# Patient Record
Sex: FEMALE | Race: WHITE | Employment: FULL TIME | URBAN - METROPOLITAN AREA
[De-identification: names, ages, dates, MRNs, and addresses within clinical notes are randomized per-mention and may not be internally consistent; named-entity substitution may affect disease eponyms.]

---

## 2019-07-20 ENCOUNTER — HOSPITAL ENCOUNTER (EMERGENCY)
Age: 24
Discharge: HOME | End: 2019-07-20
Attending: EMERGENCY MEDICINE
Payer: COMMERCIAL

## 2019-07-20 VITALS
OXYGEN SATURATION: 99 % | RESPIRATION RATE: 16 BRPM | HEIGHT: 69 IN | DIASTOLIC BLOOD PRESSURE: 75 MMHG | BODY MASS INDEX: 27.4 KG/M2 | SYSTOLIC BLOOD PRESSURE: 119 MMHG | HEART RATE: 52 BPM | TEMPERATURE: 98.9 F | WEIGHT: 185 LBS

## 2019-07-20 DIAGNOSIS — N94.6 DYSMENORRHEA: Primary | ICD-10-CM

## 2019-07-20 LAB
ABO/RH: NORMAL
ANION GAP SERPL CALCULATED.3IONS-SCNC: 13 MMOL/L (ref 3–16)
BASOPHILS ABSOLUTE: 0.2 K/UL (ref 0–0.2)
BASOPHILS RELATIVE PERCENT: 2.8 %
BILIRUBIN URINE: NEGATIVE
BLOOD, URINE: NEGATIVE
BUN BLDV-MCNC: 10 MG/DL (ref 7–20)
CALCIUM SERPL-MCNC: 9.5 MG/DL (ref 8.3–10.6)
CHLORIDE BLD-SCNC: 102 MMOL/L (ref 99–110)
CLARITY: CLEAR
CO2: 25 MMOL/L (ref 21–32)
COLOR: YELLOW
CREAT SERPL-MCNC: 0.9 MG/DL (ref 0.6–1.1)
EOSINOPHILS ABSOLUTE: 0.1 K/UL (ref 0–0.6)
EOSINOPHILS RELATIVE PERCENT: 0.9 %
GFR AFRICAN AMERICAN: >60
GFR NON-AFRICAN AMERICAN: >60
GLUCOSE BLD-MCNC: 94 MG/DL (ref 70–99)
GLUCOSE URINE: NEGATIVE MG/DL
GONADOTROPIN, CHORIONIC (HCG) QUANT: <5 MIU/ML
HCG(URINE) PREGNANCY TEST: NEGATIVE
HCT VFR BLD CALC: 36.3 % (ref 36–48)
HEMOGLOBIN: 11.5 G/DL (ref 12–16)
KETONES, URINE: NEGATIVE MG/DL
LEUKOCYTE ESTERASE, URINE: NEGATIVE
LYMPHOCYTES ABSOLUTE: 1.3 K/UL (ref 1–5.1)
LYMPHOCYTES RELATIVE PERCENT: 19.1 %
MCH RBC QN AUTO: 25.8 PG (ref 26–34)
MCHC RBC AUTO-ENTMCNC: 31.8 G/DL (ref 31–36)
MCV RBC AUTO: 81.1 FL (ref 80–100)
MICROSCOPIC EXAMINATION: NORMAL
MONOCYTES ABSOLUTE: 0.2 K/UL (ref 0–1.3)
MONOCYTES RELATIVE PERCENT: 3.5 %
NEUTROPHILS ABSOLUTE: 5.1 K/UL (ref 1.7–7.7)
NEUTROPHILS RELATIVE PERCENT: 73.7 %
NITRITE, URINE: NEGATIVE
PDW BLD-RTO: 14.9 % (ref 12.4–15.4)
PH UA: 6 (ref 5–8)
PLATELET # BLD: 211 K/UL (ref 135–450)
PMV BLD AUTO: 9.6 FL (ref 5–10.5)
POTASSIUM REFLEX MAGNESIUM: 3.9 MMOL/L (ref 3.5–5.1)
PROTEIN UA: NEGATIVE MG/DL
RBC # BLD: 4.48 M/UL (ref 4–5.2)
SODIUM BLD-SCNC: 140 MMOL/L (ref 136–145)
SPECIFIC GRAVITY UA: 1.02 (ref 1–1.03)
URINE REFLEX TO CULTURE: NORMAL
URINE TYPE: NORMAL
UROBILINOGEN, URINE: 0.2 E.U./DL
WBC # BLD: 7 K/UL (ref 4–11)

## 2019-07-20 PROCEDURE — 86901 BLOOD TYPING SEROLOGIC RH(D): CPT

## 2019-07-20 PROCEDURE — 85025 COMPLETE CBC W/AUTO DIFF WBC: CPT

## 2019-07-20 PROCEDURE — 84703 CHORIONIC GONADOTROPIN ASSAY: CPT

## 2019-07-20 PROCEDURE — 99284 EMERGENCY DEPT VISIT MOD MDM: CPT

## 2019-07-20 PROCEDURE — 80048 BASIC METABOLIC PNL TOTAL CA: CPT

## 2019-07-20 PROCEDURE — 81003 URINALYSIS AUTO W/O SCOPE: CPT

## 2019-07-20 PROCEDURE — 84702 CHORIONIC GONADOTROPIN TEST: CPT

## 2019-07-20 PROCEDURE — 86900 BLOOD TYPING SEROLOGIC ABO: CPT

## 2019-07-20 ASSESSMENT — PAIN DESCRIPTION - ORIENTATION: ORIENTATION: LOWER

## 2019-07-20 ASSESSMENT — PAIN SCALES - GENERAL: PAINLEVEL_OUTOF10: 8

## 2019-07-20 ASSESSMENT — PAIN DESCRIPTION - FREQUENCY: FREQUENCY: INTERMITTENT

## 2019-07-20 ASSESSMENT — PAIN DESCRIPTION - LOCATION: LOCATION: ABDOMEN

## 2019-07-20 NOTE — ED PROVIDER NOTES
[address]  EMERGENCY DEPARTMENT ENCOUNTER        Pt Name: Levon Sanders  MRN: [id number]  [place] 1995  Date of evaluation: 7/20/2019  Provider: NAFISA Kan CNP  PCP: NAFISA Carvajal CNP    This patient was seen and evaluated by the attending physician Sushil Ballard, [facility]       Chief Complaint   Patient presents with    Abdominal Pain     Postive pregnancy @ home, onset of vaginal bleeding 7/18, went to [facility] ED \"hcg level was to low to be pregnant\" per Pt, vaginal bleeding increased last pm, onset of ABD pain, clots. HISTORY OF PRESENT ILLNESS   (Location/Symptom, Timing/Onset, Context/Setting, Quality, Duration, Modifying Factors, Severity)  Note limiting factors. Levon Sanders is a 26 y.o. female who presents for evaluation of pelvic pain and vaginal bleeding. Patient states that she started taking birth control pills approximately 2 weeks ago and has been sexually active since then, yesterday she started spotting lightly and took a pregnancy test.  States that her home pregnancy test was positive, she went to an emergency room last night, was told that she was not pregnant, and to follow-up with her OB/GYN. Patient states that today she developed heavier bleeding with clots and is having right-sided pelvic pain. States that she does have a history of ovarian cysts. Patient is G2, P2. Patient denies taking any analgesics or medications prior to arrival.    Nursing Notes were all reviewed and agreed with or any disagreements were addressed  in the HPI. REVIEW OF SYSTEMS    (2-9 systems for level 4, 10 or more for level 5)     Review of Systems    Positives and Pertinent negatives as per HPI. Except as noted abovein the ROS, all other systems were reviewed and negative.        PAST MEDICAL HISTORY     Past Medical History:   Diagnosis Date    Asthma     PCOS (polycystic ovarian syndrome) [facility],  [place], [facility]   Phone [phone]       All other labs were within normal range or not returned as of this dictation. EKG: All EKG's are interpreted by the Emergency Department Physician in the absence of a cardiologist.  Please see their note for interpretation of EKG. RADIOLOGY:   Non-plain film images such as CT, Ultrasound and MRI are read by the radiologist. Plain radiographic images are visualized andpreliminarily interpreted by the  ED Provider with the below findings:        Interpretation perthe Radiologist below, if available at the time of this note:    No orders to display     No results found. PROCEDURES   Unless otherwise noted below, none     Procedures    CRITICAL CARE TIME   N/A    CONSULTS:  None      EMERGENCY DEPARTMENT COURSE and DIFFERENTIAL DIAGNOSIS/MDM:   Vitals:    Vitals:    07/20/19 1356 07/20/19 1404 07/20/19 1424 07/20/19 1439   BP: 133/81 125/77 130/86 119/75   Pulse: 56 52 58 52   Resp: 17 18 17 16   Temp:       TempSrc:       SpO2: 99% 98% 98% 99%   Weight:       Height:           Patient was given thefollowing medications:  Medications - No data to display    Patient is nontoxic, no apparent distress, laying on the bed. Lab work and urinalysis ordered. Urine pregnancy test negative, urinalysis unremarkable. CBC and CMP unremarkable. Rh completed and patient is negative. hCG quantitative negative. 26 -Dr. Vianey Payne at bedside to discuss negative work-up with patient. Patient declined pelvic exam at this time. Patient instructed to follow-up with OB/GYN and return for any worsening symptoms. Differential diagnoses include but are not limited to ectopic pregnancy, pregnancy, urinary tract infection, kidney stone, and dysmenorrhea. FINAL IMPRESSION      1.  Dysmenorrhea          DISPOSITION/PLAN   DISPOSITION Decision To Discharge 07/20/2019 02:24:13 PM      PATIENT REFERREDTO:  NAFISA Epps - CNP  [id number]

## 2023-09-03 ENCOUNTER — HOSPITAL ENCOUNTER (INPATIENT)
Age: 28
LOS: 4 days | Discharge: HOME | End: 2023-09-07
Attending: PSYCHIATRY & NEUROLOGY | Admitting: PSYCHIATRY & NEUROLOGY
Payer: MEDICAID

## 2023-09-03 PROBLEM — F32.A DEPRESSION, UNSPECIFIED: Status: ACTIVE | Noted: 2023-09-03

## 2023-09-03 PROCEDURE — 6370000000 HC RX 637 (ALT 250 FOR IP)

## 2023-09-03 PROCEDURE — 1240000000 HC EMOTIONAL WELLNESS R&B

## 2023-09-03 RX ORDER — MAGNESIUM HYDROXIDE/ALUMINUM HYDROXICE/SIMETHICONE 120; 1200; 1200 MG/30ML; MG/30ML; MG/30ML
30 SUSPENSION ORAL EVERY 6 HOURS PRN
Status: DISCONTINUED | OUTPATIENT
Start: 2023-09-03 | End: 2023-09-07 | Stop reason: HOSPADM

## 2023-09-03 RX ORDER — TRAZODONE HYDROCHLORIDE 50 MG/1
50 TABLET ORAL NIGHTLY PRN
Status: DISCONTINUED | OUTPATIENT
Start: 2023-09-03 | End: 2023-09-07 | Stop reason: HOSPADM

## 2023-09-03 RX ORDER — TIZANIDINE 2 MG/1
1 TABLET ORAL 2 TIMES DAILY PRN
COMMUNITY
Start: 2023-08-30

## 2023-09-03 RX ORDER — PROPRANOLOL HYDROCHLORIDE 10 MG/1
1 TABLET ORAL EVERY 6 HOURS PRN
COMMUNITY
Start: 2023-06-02

## 2023-09-03 RX ORDER — DIPHENHYDRAMINE HYDROCHLORIDE 50 MG/ML
50 INJECTION INTRAMUSCULAR; INTRAVENOUS EVERY 4 HOURS PRN
Status: DISCONTINUED | OUTPATIENT
Start: 2023-09-03 | End: 2023-09-07 | Stop reason: HOSPADM

## 2023-09-03 RX ORDER — FLUOXETINE HYDROCHLORIDE 20 MG/1
20 CAPSULE ORAL DAILY
Status: ON HOLD | COMMUNITY
Start: 2013-06-02 | End: 2023-09-07 | Stop reason: HOSPADM

## 2023-09-03 RX ORDER — OLANZAPINE 5 MG/1
5 TABLET ORAL EVERY 4 HOURS PRN
Status: DISCONTINUED | OUTPATIENT
Start: 2023-09-03 | End: 2023-09-07 | Stop reason: HOSPADM

## 2023-09-03 RX ORDER — POLYETHYLENE GLYCOL 3350 17 G
2 POWDER IN PACKET (EA) ORAL
Status: DISCONTINUED | OUTPATIENT
Start: 2023-09-03 | End: 2023-09-07 | Stop reason: HOSPADM

## 2023-09-03 RX ORDER — LORAZEPAM 0.5 MG/1
0.5 TABLET ORAL EVERY 8 HOURS PRN
COMMUNITY
Start: 2023-08-15

## 2023-09-03 RX ORDER — ACETAMINOPHEN 325 MG/1
650 TABLET ORAL EVERY 4 HOURS PRN
Status: DISCONTINUED | OUTPATIENT
Start: 2023-09-03 | End: 2023-09-07 | Stop reason: HOSPADM

## 2023-09-03 RX ORDER — HYDROXYZINE 50 MG/1
50 TABLET, FILM COATED ORAL 3 TIMES DAILY PRN
Status: DISCONTINUED | OUTPATIENT
Start: 2023-09-03 | End: 2023-09-07 | Stop reason: HOSPADM

## 2023-09-03 RX ADMIN — HYDROXYZINE HYDROCHLORIDE 50 MG: 50 TABLET, FILM COATED ORAL at 18:48

## 2023-09-03 ASSESSMENT — SLEEP AND FATIGUE QUESTIONNAIRES
SLEEP PATTERN: DIFFICULTY FALLING ASLEEP;RESTLESSNESS;DISTURBED/INTERRUPTED SLEEP;NIGHTMARES/TERRORS
AVERAGE NUMBER OF SLEEP HOURS: 6
DO YOU HAVE DIFFICULTY SLEEPING: YES
DO YOU USE A SLEEP AID: YES

## 2023-09-03 ASSESSMENT — PATIENT HEALTH QUESTIONNAIRE - PHQ9
7. TROUBLE CONCENTRATING ON THINGS, SUCH AS READING THE NEWSPAPER OR WATCHING TELEVISION: 2
1. LITTLE INTEREST OR PLEASURE IN DOING THINGS: 3
SUM OF ALL RESPONSES TO PHQ QUESTIONS 1-9: 25
5. POOR APPETITE OR OVEREATING: 3
4. FEELING TIRED OR HAVING LITTLE ENERGY: 3
3. TROUBLE FALLING OR STAYING ASLEEP: 3
SUM OF ALL RESPONSES TO PHQ QUESTIONS 1-9: 25
6. FEELING BAD ABOUT YOURSELF - OR THAT YOU ARE A FAILURE OR HAVE LET YOURSELF OR YOUR FAMILY DOWN: 3
SUM OF ALL RESPONSES TO PHQ QUESTIONS 1-9: 22
8. MOVING OR SPEAKING SO SLOWLY THAT OTHER PEOPLE COULD HAVE NOTICED. OR THE OPPOSITE, BEING SO FIGETY OR RESTLESS THAT YOU HAVE BEEN MOVING AROUND A LOT MORE THAN USUAL: 2
SUM OF ALL RESPONSES TO PHQ QUESTIONS 1-9: 25
SUM OF ALL RESPONSES TO PHQ9 QUESTIONS 1 & 2: 6
10. IF YOU CHECKED OFF ANY PROBLEMS, HOW DIFFICULT HAVE THESE PROBLEMS MADE IT FOR YOU TO DO YOUR WORK, TAKE CARE OF THINGS AT HOME, OR GET ALONG WITH OTHER PEOPLE: 3
9. THOUGHTS THAT YOU WOULD BE BETTER OFF DEAD, OR OF HURTING YOURSELF: 3
2. FEELING DOWN, DEPRESSED OR HOPELESS: 3

## 2023-09-03 ASSESSMENT — LIFESTYLE VARIABLES
HOW OFTEN DO YOU HAVE A DRINK CONTAINING ALCOHOL: 4 OR MORE TIMES A WEEK
HOW MANY STANDARD DRINKS CONTAINING ALCOHOL DO YOU HAVE ON A TYPICAL DAY: 1 OR 2

## 2023-09-03 NOTE — BH NOTE
[facility]  Admission Note     Admission Type:   Admission Type: Voluntary    Reason for admission:  Reason for Admission: Suicidal ideation      Addictive Behavior:   Addictive Behavior  In the Past 3 Months, Have You Felt or Has Someone Told You That You Have a Problem With  : Shopping, Internet use    Medical Problems:   Past Medical History:   Diagnosis Date    Asthma     Hyperadrenalism ([address])     Mitral valve prolapse     PCOS (polycystic ovarian syndrome)        Status EXAM:  Mental Status and Behavioral Exam  Normal: No  Level of Assistance: Independent/Self  Facial Expression: Avoids Gaze, Sad  Affect: Congruent, Stable  Level of Consciousness: Alert  Frequency of Checks: 4 times per hour, close  Mood:Normal: No  Mood: Depressed, Anxious  Motor Activity:Normal: Yes  Eye Contact: Fair  Observed Behavior: Cooperative  Sexual Misconduct History: Current - no  Preception: [place] to person, [place] to time, [place] to place, [place] to situation  Attention:Normal: Yes  Thought Processes: Unremarkable  Thought Content:Normal: Yes  Depression Symptoms: Appetite change, Crying, Change in energy level, Feelings of helplessness, Feelings of hopelessess, Feelings of worthlessness, Impaired concentration, Increased irritability, Isolative, Loss of interest, Sleep disturbance  Anxiety Symptoms: Generalized  Maggy Symptoms: No problems reported or observed.   Hallucinations: None  Delusions: No  Memory:Normal: Yes  Insight and Judgment: No  Insight and Judgment: Poor judgment    Tobacco Screening:  Practical Counseling, on admission, yesi X, if applicable and completed (first 3 are required if patient doesn't refuse):            ( ) Recognizing danger situations (included triggers and roadblocks)                    ( ) Coping skills (new ways to manage stress,relaxation techniques, changing routine, distraction)                                                           ( ) Basic information about quitting (benefits of quitting, techniques in how to quit, available resources  ( ) Referral for counseling faxed to [facility]                                                                                                                   ( ) Patient refused counseling  (x ) Patient has not smoked in the last 30 days    Metabolic Screening:    No results found for: LABA1C    No results found for: CHOL  No results found for: TRIG  No results found for: HDL  No components found for: LDLCAL  No results found for: LABVLDL      There is no height or weight on file to calculate BMI. BP Readings from Last 2 Encounters:   09/03/23 118/78   07/20/19 119/75       Recliner Assessment:  Patient is able to demonstrate the ability to move from a reclining position to an upright position within the recliner. Pt admitted with followings belongings:  Dental Appliances: None  Vision - Corrective Lenses: Contact Lenses  Hearing Aid: None  Jewelry: None  Body Piercings Removed: N/A  Clothing: Shirt, Pants, Undergarments, Footwear  Other Valuables: Other (Comment) (4 debit cards)  The following personal items were collected during admission. Items secured in locker/safe. Items will be returned to patient at discharge.      Mati Markham RN

## 2023-09-03 NOTE — BH NOTE
Pt arrived to unit in stable condition from [facility] by EMS. Oriented to room and unit. Pt denies active suicidal ideation at this time. Constant observation initiated on arrival to floor.

## 2023-09-03 NOTE — BH NOTE
CSSR-S Assessment completed with patient who then scored HIGH RISK. Provider A Juan Diego Brewster was notified of HIGH RISK score.     Is the patient currently expressing suicidal ideation: NO    Is the patient willing to notify staff of suicidal ideation: YES    Completed by: Daisy Heart RN

## 2023-09-04 PROBLEM — Z00.00 GENERAL MEDICAL EXAM: Status: ACTIVE | Noted: 2023-09-04

## 2023-09-04 PROBLEM — N30.00 ACUTE CYSTITIS WITHOUT HEMATURIA: Status: ACTIVE | Noted: 2023-09-04

## 2023-09-04 LAB
BACTERIA URNS QL MICRO: ABNORMAL /HPF
BILIRUB UR QL STRIP.AUTO: NEGATIVE
CLARITY UR: CLEAR
COLOR UR: YELLOW
EPI CELLS #/AREA URNS HPF: ABNORMAL /HPF (ref 0–5)
GLUCOSE UR STRIP.AUTO-MCNC: NEGATIVE MG/DL
HGB UR QL STRIP.AUTO: NEGATIVE
KETONES UR STRIP.AUTO-MCNC: NEGATIVE MG/DL
LEUKOCYTE ESTERASE UR QL STRIP.AUTO: ABNORMAL
MUCOUS THREADS #/AREA URNS LPF: ABNORMAL /LPF
NITRITE UR QL STRIP.AUTO: NEGATIVE
PH UR STRIP.AUTO: 6 [PH] (ref 5–8)
PROT UR STRIP.AUTO-MCNC: NEGATIVE MG/DL
RBC #/AREA URNS HPF: ABNORMAL /HPF (ref 0–4)
SP GR UR STRIP.AUTO: 1.02 (ref 1–1.03)
UA COMPLETE W REFLEX CULTURE PNL UR: YES
UA DIPSTICK W REFLEX MICRO PNL UR: YES
URN SPEC COLLECT METH UR: ABNORMAL
UROBILINOGEN UR STRIP-ACNC: 0.2 E.U./DL
WBC #/AREA URNS HPF: ABNORMAL /HPF (ref 0–5)

## 2023-09-04 PROCEDURE — 1240000000 HC EMOTIONAL WELLNESS R&B

## 2023-09-04 PROCEDURE — 81001 URINALYSIS AUTO W/SCOPE: CPT

## 2023-09-04 PROCEDURE — 99221 1ST HOSP IP/OBS SF/LOW 40: CPT

## 2023-09-04 PROCEDURE — 6370000000 HC RX 637 (ALT 250 FOR IP)

## 2023-09-04 PROCEDURE — 87086 URINE CULTURE/COLONY COUNT: CPT

## 2023-09-04 RX ORDER — FLUOXETINE HYDROCHLORIDE 20 MG/1
20 CAPSULE ORAL DAILY
Status: DISCONTINUED | OUTPATIENT
Start: 2023-09-04 | End: 2023-09-05

## 2023-09-04 RX ORDER — NITROFURANTOIN 25; 75 MG/1; MG/1
100 CAPSULE ORAL EVERY 12 HOURS SCHEDULED
Status: DISCONTINUED | OUTPATIENT
Start: 2023-09-04 | End: 2023-09-06

## 2023-09-04 RX ORDER — LORAZEPAM 0.5 MG/1
0.5 TABLET ORAL EVERY 8 HOURS PRN
Status: DISCONTINUED | OUTPATIENT
Start: 2023-09-04 | End: 2023-09-07 | Stop reason: HOSPADM

## 2023-09-04 RX ADMIN — TRAZODONE HYDROCHLORIDE 50 MG: 50 TABLET ORAL at 20:17

## 2023-09-04 RX ADMIN — NITROFURANTOIN MONOHYDRATE/MACROCRYSTALS 100 MG: 75; 25 CAPSULE ORAL at 20:17

## 2023-09-04 RX ADMIN — FLUOXETINE 20 MG: 20 CAPSULE ORAL at 11:20

## 2023-09-04 RX ADMIN — HYDROXYZINE HYDROCHLORIDE 50 MG: 50 TABLET, FILM COATED ORAL at 19:05

## 2023-09-04 ASSESSMENT — PATIENT HEALTH QUESTIONNAIRE - PHQ9
10. IF YOU CHECKED OFF ANY PROBLEMS, HOW DIFFICULT HAVE THESE PROBLEMS MADE IT FOR YOU TO DO YOUR WORK, TAKE CARE OF THINGS AT HOME, OR GET ALONG WITH OTHER PEOPLE: 3
9. THOUGHTS THAT YOU WOULD BE BETTER OFF DEAD, OR OF HURTING YOURSELF: 3
SUM OF ALL RESPONSES TO PHQ QUESTIONS 1-9: 21
7. TROUBLE CONCENTRATING ON THINGS, SUCH AS READING THE NEWSPAPER OR WATCHING TELEVISION: 2
2. FEELING DOWN, DEPRESSED OR HOPELESS: 3
SUM OF ALL RESPONSES TO PHQ QUESTIONS 1-9: 21
8. MOVING OR SPEAKING SO SLOWLY THAT OTHER PEOPLE COULD HAVE NOTICED. OR THE OPPOSITE, BEING SO FIGETY OR RESTLESS THAT YOU HAVE BEEN MOVING AROUND A LOT MORE THAN USUAL: 2
6. FEELING BAD ABOUT YOURSELF - OR THAT YOU ARE A FAILURE OR HAVE LET YOURSELF OR YOUR FAMILY DOWN: 2
SUM OF ALL RESPONSES TO PHQ QUESTIONS 1-9: 21
1. LITTLE INTEREST OR PLEASURE IN DOING THINGS: 3
3. TROUBLE FALLING OR STAYING ASLEEP: 2
SUM OF ALL RESPONSES TO PHQ QUESTIONS 1-9: 18
5. POOR APPETITE OR OVEREATING: 2
4. FEELING TIRED OR HAVING LITTLE ENERGY: 2
SUM OF ALL RESPONSES TO PHQ9 QUESTIONS 1 & 2: 6

## 2023-09-04 ASSESSMENT — SLEEP AND FATIGUE QUESTIONNAIRES
DO YOU HAVE DIFFICULTY SLEEPING: YES
AVERAGE NUMBER OF SLEEP HOURS: 5
SLEEP PATTERN: DIFFICULTY FALLING ASLEEP;RESTLESSNESS
DO YOU USE A SLEEP AID: YES

## 2023-09-04 NOTE — PROGRESS NOTES
Behavioral Services  Medicare Certification Upon Admission    I certify that this patient's inpatient psychiatric hospital admission is medically necessary for:    [x] (1) Treatment which could reasonably be expected to improve this patient's condition,       [x] (2) Or for diagnostic study;     AND     [x](2) The inpatient psychiatric services are provided while the individual is under the care of a physician and are included in the individualized plan of care.     Estimated length of stay/service 2-5 days    Plan for post-hospital care outpatient services    Electronically signed by NAFISA Mckeon CNP on 9/4/2023 at 11:05 AM

## 2023-09-04 NOTE — H&P
INITIAL PSYCHIATRIC HISTORY AND PHYSICAL      Patient name: Tavia Boyd  Admit date: 9/3/2023  Today's date: 9/4/2023           CC:  Depression    HPI:   Patient seen in room on Adult Behavioral Unit. Patient is a 24 y.o. female who presents  from [facility] for increasing depression and suicidal thoughts. Pt recently purchased a gun with thoughts of shooting herself. Not taking her medication as prescribed. Pt now on BHI, reports that she recently went through a break up with a man she considered a best friend. They were both helping one another stay sober. He relapsed and she feels guilty about this. Pt became more depressed, reports cutting (first time since age 14), and relapsing with alcohol and marijuana. Pt did return to her PCP to go back on Prozac, but has not been taking consistently. Pt reports feeling \"numb\", increased anxiety, thoughts of ending her life, racing/intrusive thoughts. 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 Pt with multiple health issues recently as well. Diagnosed with non-epileptic seizures, her last was on Friday. Seen by  Neurology, placed on Ativan prn for symptoms (chills, tremors, fatigue, cold/clammy skin, does not always lose consciousness). Notes from  recommend mental health treatment for cause of these events. Pt also needs to follow up with cardiology for issues with BP and valve issues that she was told were misdiagnosed. Pt states she gets overwhelmed with all the specialists she was supposed to see, stopped her cardiac meds and has not followed up. Pt vasquez shave good insight into her mental health. Sees a therapist, in the process of switching. Saw a DBT therapist for s short time, and feels that was more helpful to her. Pt denies current HI/AVH, no plan or intent of self harm.   Gun was given away magnesium hydroxide (MILK OF MAGNESIA) 400 MG/5ML suspension 30 mL  30 mL Oral Daily PRN [facility], APRN - CNP        nicotine polacrilex (COMMIT) lozenge 2 mg  2 mg Oral Q1H PRN [facility], APRN - CNP        aluminum & magnesium hydroxide-simethicone (MAALOX) 200-200-20 MG/5ML suspension 30 mL  30 mL Oral Q6H PRN [facility], APRN - CNP        hydrOXYzine HCl (ATARAX) tablet 50 mg  50 mg Oral TID PRN [facility], [facility] - CNP   50 mg at 09/03/23 1848    OLANZapine (ZYPREXA) tablet 5 mg  5 mg Oral Q4H PRN [facility], APRN - CNP        Or    OLANZapine (ZYPREXA) 10 mg in sterile water 2 mL injection  10 mg IntraMUSCular Q4H PRN [facility], APRN - CNP        diphenhydrAMINE (BENADRYL) injection 50 mg  50 mg IntraMUSCular Q4H PRN [facility], APRN - CNP        traZODone (DESYREL) tablet 50 mg  50 mg Oral Nightly PRN [facility], APRN - CNP          FLUoxetine  20 mg Oral Daily      PRN Meds: LORazepam, acetaminophen, magnesium hydroxide, nicotine polacrilex, aluminum & magnesium hydroxide-simethicone, hydrOXYzine HCl, OLANZapine **OR** OLANZapine (ZyPREXA) in sterile water IntraMUSCular, diphenhydrAMINE, traZODone   Estimated length of stay: 2-5 days  Prognosis:  Fair   Criteria for Discharge:  Not suicidal, sleeping well, affect stable, depression improving, eating well, aftercare arranged. Spent > 70 minutes evaluating and treating patient, more than 50 % of that time was spent counseling the patient on their symptoms, treatment, and expected goals. ______  PLAN   1. Admit to Adult Behavioral Unit / Senior Behavioral Unit  2. Consult Internal Medicine to evaluate and treat medical conditions  3. Adjust psychotropic medications to target symptoms  4. Occupational Therapy, Physical Therapy, Group Psychotherapy as tolerated   5. Reviewed treatment plan with patient including medication risks, benefits, side effects. Obtained informed consent for treatment.      Uday Jarrett PMELIESER-BC

## 2023-09-04 NOTE — H&P
5% (2D biplane) Normal left ventricular diastolic function.   - The right ventricle is normal in size. Right ventricular systolic function is   normal.   - Trivial MR and TR.   - Estimated right ventricular systolic pressure is likely underestimated due to a weak or incomplete tricuspid regurgitation signal and is, at least, 20 mmHg  consistent with normal pulmonary artery pressures. Estimated right atrial pressure  is 3 mmHg based on IVC assessment. - The patient has not had a prior CC echocardiographic exam for comparison. Electronically signed by Alida Schultz MD on 8/1/2023 at 5:25:45 PM     ASSESSMENT/PLAN:    #Suicidal Ideation  - per psychiatry team    #Acute Cystitis without hematuria  - UA as above  - start Macrobid  - urine culture pending     #Dysautonomia  #Hx MVP - patient states recently told she does not have  #Implanted loop recorder present  - follows with [facility]  - recently seen by [facility] who r/o MVP and referred patient to EP specialist   - patient reports no longer taken Inderal  - vitals currently stable  - patient needs to follow up with primary cardiologist for closer EP referral as the referral given to her by [facility] is in [facility]    #Psychogenic Nonepileptic Seizures  - reports she has been to a number of different specialists with no answers  - last episode was last Friday  - patient states she usually feels very fatigued, turns pale, sometimes has tremors, but does not always lose consciousness  - extensive seizure workup performed at [facility] where she was monitored for several days with photic stimulation and sleep deprivation  - the episodes she experienced were not correlating with EEG reading but consistent with PNES  - taken off Keppra  - seizure precautions  - patient needs to return to follow up with primary Neurologist     #Alcohol abuse  - no current s/s withdrawal  - monitor        Pt has no medical complaints at this time.  They were informed that should a medical concern arise during their admission they may have BHI contact us.      NAFISA Rivas CNP   9/4/2023 11:06 AM

## 2023-09-04 NOTE — PLAN OF CARE
Problem: Anxiety  Goal: Will report anxiety at manageable levels  Description: INTERVENTIONS:  1. Administer medication as ordered  2. Teach and rehearse alternative coping skills  3. Provide emotional support with 1:1 interaction with staff  Outcome: Progressing     Problem: Coping  Goal: Pt/Family able to verbalize concerns and demonstrate effective coping strategies  Description: INTERVENTIONS:  1. Assist patient/family to identify coping skills, available support systems and cultural and spiritual values  2. Provide emotional support, including active listening and acknowledgement of concerns of patient and caregivers  3. Reduce environmental stimuli, as able  4. Instruct patient/family in relaxation techniques, as appropriate  5. Assess for spiritual pain/suffering and initiate Spiritual Care, Psychosocial Clinical Specialist consults as needed  Outcome: Progressing     Problem: Behavior  Goal: Pt/Family maintain appropriate behavior and adhere to behavioral management agreement, if implemented  Description: INTERVENTIONS:  1. Assess patient/family's coping skills and  non-compliant behavior (including use of illegal substances)  2. Notify security of behavior or suspected illegal substances which indicate the need for search of the family and/or belongings  3. Encourage verbalization of thoughts and concerns in a socially appropriate manner  4. Utilize positive, consistent limit setting strategies supporting safety of patient, staff and others  5. Encourage participation in the decision making process about the behavioral management agreement  6. If a visitor's behavior poses a threat to safety call refer to organization policy. 7. Initiate consult with , Psychosocial CNS, Spiritual Care as appropriate  Outcome: Progressing   Patient has mainly been withdrawn to her room reading. She is A&Ox4 and denies thoughts to want to harm self or others. She denies that she is experiencing hallucinations.

## 2023-09-04 NOTE — PLAN OF CARE
Problem: Anxiety  Goal: Will report anxiety at manageable levels  Description: INTERVENTIONS:  1. Administer medication as ordered  2. Teach and rehearse alternative coping skills  3. Provide emotional support with 1:1 interaction with staff  Outcome: Not Progressing  Flowsheets (Taken 9/3/2023 1658 by Julia Zuleta LPN)  Will report anxiety at manageable levels:   Administer medication as ordered   Teach and rehearse alternative coping skills   Provide emotional support with 1:1 interaction with staff     Problem: Coping  Goal: Pt/Family able to verbalize concerns and demonstrate effective coping strategies  Description: INTERVENTIONS:  1. Assist patient/family to identify coping skills, available support systems and cultural and spiritual values  2. Provide emotional support, including active listening and acknowledgement of concerns of patient and caregivers  3. Reduce environmental stimuli, as able  4. Instruct patient/family in relaxation techniques, as appropriate  5. Assess for spiritual pain/suffering and initiate Spiritual Care, Psychosocial Clinical Specialist consults as needed  Outcome: Not Progressing  Flowsheets (Taken 9/3/2023 1658 by Julia Zuleta LPN)  Patient/family able to verbalize anxieties, fears, and concerns, and demonstrate effective coping:   Provide emotional support, including active listening and acknowledgement of concerns of patient and caregivers   Reduce environmental stimuli, as able   Instruct patient/family in relaxation techniques, as appropriate     Problem: Behavior  Goal: Pt/Family maintain appropriate behavior and adhere to behavioral management agreement, if implemented  Description: INTERVENTIONS:  1. Assess patient/family's coping skills and  non-compliant behavior (including use of illegal substances)  2. Notify security of behavior or suspected illegal substances which indicate the need for search of the family and/or belongings  3.  Encourage verbalization of thoughts
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
## 2023-09-05 LAB — BACTERIA UR CULT: NORMAL

## 2023-09-05 PROCEDURE — 6370000000 HC RX 637 (ALT 250 FOR IP)

## 2023-09-05 PROCEDURE — 1240000000 HC EMOTIONAL WELLNESS R&B

## 2023-09-05 RX ORDER — FLUOXETINE HYDROCHLORIDE 20 MG/1
40 CAPSULE ORAL DAILY
Status: DISCONTINUED | OUTPATIENT
Start: 2023-09-06 | End: 2023-09-07 | Stop reason: HOSPADM

## 2023-09-05 RX ORDER — INSULIN LISPRO 100 [IU]/ML
0-4 INJECTION, SOLUTION INTRAVENOUS; SUBCUTANEOUS
Status: DISCONTINUED | OUTPATIENT
Start: 2023-09-06 | End: 2023-09-05

## 2023-09-05 RX ORDER — INSULIN LISPRO 100 [IU]/ML
0-4 INJECTION, SOLUTION INTRAVENOUS; SUBCUTANEOUS NIGHTLY
Status: DISCONTINUED | OUTPATIENT
Start: 2023-09-05 | End: 2023-09-05

## 2023-09-05 RX ADMIN — FLUOXETINE 20 MG: 20 CAPSULE ORAL at 09:32

## 2023-09-05 RX ADMIN — NITROFURANTOIN MONOHYDRATE/MACROCRYSTALS 100 MG: 75; 25 CAPSULE ORAL at 21:46

## 2023-09-05 RX ADMIN — TRAZODONE HYDROCHLORIDE 50 MG: 50 TABLET ORAL at 21:46

## 2023-09-05 RX ADMIN — MAGNESIUM HYDROXIDE 30 ML: 400 SUSPENSION ORAL at 18:59

## 2023-09-05 RX ADMIN — LORAZEPAM 0.5 MG: 0.5 TABLET ORAL at 12:05

## 2023-09-05 RX ADMIN — NITROFURANTOIN MONOHYDRATE/MACROCRYSTALS 100 MG: 75; 25 CAPSULE ORAL at 09:32

## 2023-09-05 NOTE — PROGRESS NOTES
Bilirubin Urine 09/04/2023 Negative  Negative Final    Ketones, Urine 09/04/2023 Negative  Negative mg/dL Final    Specific Gravity, UA 09/04/2023 1.020  1.005 - 1.030 Final    Blood, Urine 09/04/2023 Negative  Negative Final    pH, UA 09/04/2023 6.0  5.0 - 8.0 Final    Protein, UA 09/04/2023 Negative  Negative mg/dL Final    Urobilinogen, Urine 09/04/2023 0.2  <2.0 E.U./dL Final    Nitrite, Urine 09/04/2023 Negative  Negative Final    Leukocyte Esterase, Urine 09/04/2023 MODERATE (A)  Negative Final    Microscopic Examination 09/04/2023 YES   Final    Urine Type 09/04/2023 NotGiven   Final    Urine received in a container without preservatives.     Urine Reflex to Culture 09/04/2023 Yes   Final    Mucus, UA 09/04/2023 3+ (A)  None Seen /LPF Final    WBC, UA 09/04/2023 10-20 (A)  0 - 5 /HPF Final    RBC, UA 09/04/2023 3-4  0 - 4 /HPF Final    Epithelial Cells, UA 09/04/2023 11-20 (A)  0 - 5 /HPF Final    Bacteria, UA 09/04/2023 1+ (A)  None Seen /HPF Final            Medications  Current Facility-Administered Medications: [START ON 9/6/2023] FLUoxetine (PROZAC) capsule 40 mg, 40 mg, Oral, Daily  LORazepam (ATIVAN) tablet 0.5 mg, 0.5 mg, Oral, Q8H PRN  nitrofurantoin (macrocrystal-monohydrate) (MACROBID) capsule 100 mg, 100 mg, Oral, 2 times per day  acetaminophen (TYLENOL) tablet 650 mg, 650 mg, Oral, Q4H PRN  magnesium hydroxide (MILK OF MAGNESIA) 400 MG/5ML suspension 30 mL, 30 mL, Oral, Daily PRN  nicotine polacrilex (COMMIT) lozenge 2 mg, 2 mg, Oral, Q1H PRN  aluminum & magnesium hydroxide-simethicone (MAALOX) 200-200-20 MG/5ML suspension 30 mL, 30 mL, Oral, Q6H PRN  hydrOXYzine HCl (ATARAX) tablet 50 mg, 50 mg, Oral, TID PRN  OLANZapine (ZYPREXA) tablet 5 mg, 5 mg, Oral, Q4H PRN **OR** OLANZapine (ZYPREXA) 10 mg in sterile water 2 mL injection, 10 mg, IntraMUSCular, Q4H PRN  diphenhydrAMINE (BENADRYL) injection 50 mg, 50 mg, IntraMUSCular, Q4H PRN  traZODone (DESYREL) tablet 50 mg, 50 mg, Oral, Nightly PRN    ASSESSMENT AND PLAN    Principal Problem:    Depression, unspecified  Active Problems:    General medical exam    Acute cystitis without hematuria  Resolved Problems:    * No resolved hospital problems. *       1. Patient's symptoms   are improving  2. Probable discharge is this week  3. Discharge planning is incomplete  4. Suicidal ideation is better  5. Total time with patient was 40 minutes and more than 50 % of that time was spent counseling the patient on their symptoms, treatment and expected goals.      Helen Escalante, KARONP-BC

## 2023-09-05 NOTE — PLAN OF CARE
Problem: Anxiety  Goal: Will report anxiety at manageable levels  Description: INTERVENTIONS:  1. Administer medication as ordered  2. Teach and rehearse alternative coping skills  3. Provide emotional support with 1:1 interaction with staff  Outcome: Progressing     Problem: Coping  Goal: Pt/Family able to verbalize concerns and demonstrate effective coping strategies  Description: INTERVENTIONS:  1. Assist patient/family to identify coping skills, available support systems and cultural and spiritual values  2. Provide emotional support, including active listening and acknowledgement of concerns of patient and caregivers  3. Reduce environmental stimuli, as able  4. Instruct patient/family in relaxation techniques, as appropriate  5. Assess for spiritual pain/suffering and initiate Spiritual Care, Psychosocial Clinical Specialist consults as needed  Outcome: Progressing     Problem: Behavior  Goal: Pt/Family maintain appropriate behavior and adhere to behavioral management agreement, if implemented  Description: INTERVENTIONS:  1. Assess patient/family's coping skills and  non-compliant behavior (including use of illegal substances)  2. Notify security of behavior or suspected illegal substances which indicate the need for search of the family and/or belongings  3. Encourage verbalization of thoughts and concerns in a socially appropriate manner  4. Utilize positive, consistent limit setting strategies supporting safety of patient, staff and others  5. Encourage participation in the decision making process about the behavioral management agreement  6. If a visitor's behavior poses a threat to safety call refer to organization policy. 7. Initiate consult with , Psychosocial CNS, Spiritual Care as appropriate  Outcome: Progressing   Pt pleasant cooperative visible on unit all shift, pleasant on approach social with staff and peers.

## 2023-09-05 NOTE — GROUP NOTE
Group Therapy Note    Date: 9/5/2023    Group Start Time: 1100  Group End Time: 1200  Group Topic: [address], [place]        Group Therapy Note    Attendees: 4    SW met w/the group and used active listening to engage in conversation while checking in and discussing their goals from the morning meeting. The group reviewed and discuss the CBT triangle and how our thoughts, feelings, and behavior impact one another. The group then worked to review and discuss S.M.A.R.T. goals. The group worked to create, review and revise SMART goals that they can work on during their time on the unit. Notes: The Pt was cooperative and engaged throughout the group. The Pt was able to create a SMART goal and discuss it with the group. Status After Intervention:  Improved    Participation Level:  Active Listener and Interactive    Participation Quality: Appropriate, Attentive, and Sharing      Speech:  normal      Thought Process/Content: Logical      Affective Functioning: Congruent      Mood: anxious      Level of consciousness:  Alert      Response to Learning: Able to verbalize current knowledge/experience and Able to verbalize/acknowledge new learning      Endings: None Reported    Modes of Intervention: Education, Exploration, and Problem-solving      Discipline Responsible: /Counselor      Signature:  BRANDON Sanchez, [place]

## 2023-09-05 NOTE — GROUP NOTE
Group Therapy Note    Date: 9/5/2023    Group Start Time: 1300  Group End Time: 7281  Group Topic: Psychoeducation    [facility]        Group Therapy Note    Topic: Negative Automatic Thoughts    Group facilitator provided foundational information and framed process discussion of automatic thoughts, fight/flight/freeze/andry response influencing thoughts, methods to identify and inventory automatic thoughts, and mindfulness strategies to observe and manage behavioral responses. Attendees: 4       Notes:  David Mccray was present and actively engaged across discussions. Provided feedback and encouragement to peers while assisting in process of automatic thoughts, grief influencing perceptions. Relayed information she has learned from her individual therapist. Validated and supported peers throughout. No needs verbalized. Status After Intervention:  Improved    Participation Level:  Active Listener and Interactive    Participation Quality: Sharing and Supportive      Speech:  normal      Thought Process/Content: Logical      Affective Functioning: Congruent      Mood: euthymic      Level of consciousness:  Alert and Oriented x4      Response to Learning: Progressing to goal      Endings: None Reported    Modes of Intervention: Education, Support, Socialization, Exploration, Clarifying, and Problem-solving      Discipline Responsible: Psychoeducational Specialist      Signature:  Aníbal Trinidad, MM, MT-BC

## 2023-09-05 NOTE — PROGRESS NOTES
Patient visible interacting well with peers in common area upon initial observation this shift. She is cooperative with assessment. Patient reports mild anxiety, unspecified. She denies any depressive symptoms at this time. She is observed with improved affect, bright. Good eye contact. Patient denies any suicidal ideations and will notify staff should that change. Patient denies HI/aVH. She is compliant with first dose of Macrobid and received PRN Trazodone per request for sleep. No distress noted. Continuing to monitor.

## 2023-09-06 PROCEDURE — 1240000000 HC EMOTIONAL WELLNESS R&B

## 2023-09-06 PROCEDURE — 6370000000 HC RX 637 (ALT 250 FOR IP)

## 2023-09-06 RX ADMIN — TRAZODONE HYDROCHLORIDE 50 MG: 50 TABLET ORAL at 21:02

## 2023-09-06 RX ADMIN — NITROFURANTOIN MONOHYDRATE/MACROCRYSTALS 100 MG: 75; 25 CAPSULE ORAL at 08:22

## 2023-09-06 RX ADMIN — FLUOXETINE 40 MG: 20 CAPSULE ORAL at 08:22

## 2023-09-06 NOTE — GROUP NOTE
Group Therapy Note    Date: 9/6/2023    Group Start Time: 1300  Group End Time: 5152  Group Topic: Activity    [address] [facility] [address], [place]        Group Therapy Note    Attendees: 5    Therapist facilitated interactive group for participants to engage and share amongst one another. Group began with using the conversation ball as an ice breaker to encourage participation and socialization. Group members then participated in a self-reflection activity where they were challenged to identify 3 cards in which the adjective described them best.  Participants shared their three cards aloud and explained why they chose them. Group processed how identifying personal strengths aids in the recovery process and helps build purpose and self esteem. Notes:  Patient was present and engaged across duration of group. Patient actively listened and participated in group conversation through sharing and offering encouragement. Patient fully participated in group activities and asked to keep strength cards as a reminder. Met goal for group. Status After Intervention:  Improved    Participation Level:  Active Listener and Interactive    Participation Quality: Appropriate, Attentive, Sharing, and Supportive      Speech:  normal      Thought Process/Content: Logical      Affective Functioning: Congruent      Mood: euthymic      Level of consciousness:  Alert, Oriented x4, and Attentive      Response to Learning: Able to verbalize current knowledge/experience, Able to verbalize/acknowledge new learning, Able to retain information, Capable of insight, Able to change behavior, and Progressing to goal      Endings: None Reported    Modes of Intervention: Support, Socialization, Exploration, and Activity      Discipline Responsible: Psychoeducational Specialist and Recreational Therapist      Signature:  Nicholas Barrios MA, Dominic Dickerson

## 2023-09-06 NOTE — PLAN OF CARE
[facility]  Treatment Team Note  Review Date & Time: 09/06/23 0960    Patient was not in treatment team      Status EXAM:   Mental Status and Behavioral Exam  Normal: No  Level of Assistance: Independent/Self  Facial Expression: Sad  Affect: Appropriate  Level of Consciousness: Alert  Frequency of Checks: 4 times per hour, close  Mood:Normal: No  Mood: Sad  Motor Activity:Normal: Yes  Motor Activity: Other (comment) (wnl)  Eye Contact: Good  Observed Behavior: Friendly, Cooperative  Sexual Misconduct History: Current - no  Preception: [place] to person, [place] to time, [place] to place  Attention:Normal: Yes  Thought Processes: Other (comment) (wnl)  Thought Content:Normal: Yes  Thought Content: Other (comment) (wnl)  Depression Symptoms: Appetite change, Sleep disturbance  Anxiety Symptoms: Generalized  Maggy Symptoms: No problems reported or observed. Hallucinations: None  Delusions: No  Memory:Normal: Yes  Insight and Judgment: No  Insight and Judgment: Poor judgment      Suicide Risk CSSR-S:  1) Within the past month, have you wished you were dead or wished you could go to sleep and not wake up? : Yes  2) Have you actually had any thoughts of killing yourself? : Yes  3) Have you been thinking about how you might kill yourself? : Yes  5) Have you started to work out or worked out the details of how to kill yourself? Do you intend to carry out this plan? : No  6) Have you ever done anything, started to do anything, or prepared to do anything to end your life?: Yes      PLAN/TREATMENT RECOMMENDATIONS UPDATE:   Patient will take medication as prescribed, eat 75% of meals, attend groups, participate in milieu activities, participate in treatment team and care planning for discharge and follow up.            Santiago Ro RN

## 2023-09-06 NOTE — GROUP NOTE
Group Therapy Note    Date: 9/6/2023    Group Start Time: 1000  Group End Time: 1100  Group Topic: Psychoeducation    ANGEL Escobar        Group Therapy Note    Attendees: 5       Patient's Goal:  to learn and discuss the communication styles of being assertive, passive and aggressive and that communicating in an assertive manner is the healthiest way to communicate. Pt's asked to apply to their lives. Notes:  pt attended group for the full duration. She actively participated in group discussion and was able to apply to herself. pt wants to work on being more assertive. Status After Intervention:  Improved    Participation Level:  Active Listener and Interactive    Participation Quality: Appropriate, Attentive, and Sharing      Speech:  normal      Thought Process/Content: Logical      Affective Functioning: Congruent      Mood: anxious      Level of consciousness:  Alert, Oriented x4, and Attentive      Response to Learning: Able to verbalize current knowledge/experience, Able to verbalize/acknowledge new learning, and Progressing to goal      Endings: None Reported    Modes of Intervention: Education, Support, Socialization, Exploration, and Clarifying      Discipline Responsible: /Counselor      Signature:  ANGEL Esteves

## 2023-09-06 NOTE — GROUP NOTE
Group Therapy Note    Date: 9/6/2023    Group Start Time: 1100  Group End Time: 1200  Group Topic: [address], [place]        Group Therapy Note    Attendees: 7    SW used active listening to engage in conversation while checking in with the Pts and reviewing the 3 types of communication. The group then worked to discuss the ways to communicate effectively versus ineffectively by remaining open, curious, and ready to learn versus being closed, defensive and determined to be right. The group worked to share scenarios and brainstorm ways to use effective communication to work through them. Notes: The Pt was cooperative and engaged throughout the group. The Pt was able to share and/or support peers as they discussed scenarios and worked to problem solve them. Status After Intervention:  Improved    Participation Level:  Active Listener and Interactive    Participation Quality: Appropriate, Attentive, and Sharing      Speech:  normal      Thought Process/Content: Logical      Affective Functioning: Congruent      Mood: anxious      Level of consciousness:  Alert      Response to Learning: Able to verbalize current knowledge/experience and Able to verbalize/acknowledge new learning      Endings: None Reported    Modes of Intervention: Education, Exploration, and Problem-solving      Discipline Responsible: /Counselor      Signature:  BRANDON Liang, [place]

## 2023-09-06 NOTE — PLAN OF CARE
Nursing shift report 1900 to present. Patient was visible on the unit in the evening, attended group, social with select peers with no unsafe behaviors noted. She was medication compliant, denied SI, stated although still depressed she was feeling better today and also denied any UTI symptoms. Safety checks continue. Problem: Anxiety  Goal: Will report anxiety at manageable levels  Description: INTERVENTIONS:  1. Administer medication as ordered  2. Teach and rehearse alternative coping skills  3. Provide emotional support with 1:1 interaction with staff  9/6/2023 0003 by Meri Mcardle, RN  Outcome: Progressing    Problem: Self Harm/Suicidality  Goal: Will have no self-injury during hospital stay  Description: INTERVENTIONS:  1. Ensure constant observer at bedside with Q15M safety checks  2. Maintain a safe environment  3. Secure patient belongings  4. Ensure family/visitors adhere to safety recommendations  5. Ensure safety tray has been added to patient's diet order  6.   Every shift and PRN: Re-assess suicidal risk via Frequent Screener    Outcome: Progressing

## 2023-09-06 NOTE — PROGRESS NOTES
Group Therapy Note    Date: 9/5/2023  Start Time: 20:00  End Time:  21:00  Number of Participants: 9    Type of Group: Recreational   wrap up    Wellness Binder Information  Module Name:  /  Session Number:  /    Patient's Goal:  coping skills    Notes:  continuing to work on goal    Status After Intervention:  Unchanged    Participation Level:  Active Listener and Interactive    Participation Quality: Appropriate, Attentive, and Sharing      Speech:  pressured      Thought Process/Content: Logical      Affective Functioning: Blunted      Mood: anxious and depressed      Level of consciousness:  Alert, Oriented x4, and Attentive      Response to Learning: Able to change behavior      Endings: None Reported    Modes of Intervention: Socialization and Problem-solving      Discipline Responsible: [address]      Signature:  Graeme Elise

## 2023-09-06 NOTE — PLAN OF CARE
PT visible on unit eating in dinning room and attending groups, social with staff and peers. Pt requesting information on IOP program and making plans for work and discharge, denies SI,HI,AVH, pleasant on approach complies with medication and care.

## 2023-09-07 VITALS
WEIGHT: 200 LBS | BODY MASS INDEX: 31.39 KG/M2 | HEIGHT: 67 IN | DIASTOLIC BLOOD PRESSURE: 52 MMHG | SYSTOLIC BLOOD PRESSURE: 101 MMHG | RESPIRATION RATE: 16 BRPM | TEMPERATURE: 97.9 F | OXYGEN SATURATION: 98 % | HEART RATE: 62 BPM

## 2023-09-07 PROCEDURE — 6370000000 HC RX 637 (ALT 250 FOR IP)

## 2023-09-07 PROCEDURE — 5130000000 HC BRIDGE APPOINTMENT

## 2023-09-07 RX ORDER — HYDROXYZINE 50 MG/1
50 TABLET, FILM COATED ORAL 3 TIMES DAILY PRN
Qty: 30 TABLET | Refills: 0 | Status: SHIPPED | OUTPATIENT
Start: 2023-09-07 | End: 2023-09-17

## 2023-09-07 RX ORDER — TRAZODONE HYDROCHLORIDE 50 MG/1
50 TABLET ORAL NIGHTLY PRN
Qty: 30 TABLET | Refills: 0 | Status: SHIPPED | OUTPATIENT
Start: 2023-09-07

## 2023-09-07 RX ORDER — FLUOXETINE HYDROCHLORIDE 40 MG/1
40 CAPSULE ORAL DAILY
Qty: 30 CAPSULE | Refills: 0 | Status: SHIPPED | OUTPATIENT
Start: 2023-09-08

## 2023-09-07 RX ADMIN — FLUOXETINE 40 MG: 20 CAPSULE ORAL at 09:08

## 2023-09-07 RX ADMIN — HYDROXYZINE HYDROCHLORIDE 50 MG: 50 TABLET, FILM COATED ORAL at 09:36

## 2023-09-07 RX ADMIN — HYDROXYZINE HYDROCHLORIDE 50 MG: 50 TABLET, FILM COATED ORAL at 14:00

## 2023-09-07 NOTE — DISCHARGE INSTRUCTIONS
Advanced Directives:  Patient does not have a surrogate decision maker appointed   Name (if yes): N/A Phone Number: N/A  Patient does not have a psychiatric and/ or medical advanced directive or power of . Patient was offered psychiatric and/ or medical advanced directive or power of  information/completion but declined to complete   Why not? N/A    Discharge Planning is Complete. Discharge Date: 9/7/23   Reason for Hospitalization: Suicidal Ideation   Discharge Diagnosis: Depression, unspecified  Discharging to: Home     Your instructions: Your physician here was Jennifer Gary MD. If you have any questions please call the unit at [phone] for the adult unit or [phone] for [facility]. Please note that we have a patient family advisory [place] that meets the second Wednesday of January and the second Wednesday of July at [address] [place] [address] in [place] at [facility]. Department leadership would love for you to attend to give feedback on what we are doing well and areas in which we can improve our patient care. Please come if you are able and feel free to reach out to [address] [place] [address] at [phone] with any questions. The crisis number for [facility] is [phone]. This crisis line is available 24 hours a day, seven days a week. Please follow up with your PCP regarding any pending labs. You are being referred to [facility]. They provide medication management as well as therapy.    [facility]   Provider specialty/license: substance use disorder services  Date and time of appointment: 9/11/23 at 8:30 AM  The type/s of services requested are: counseling/medication management  Agency name: [facility]  Address: [address] [place], [place]  Phone Number: [phone]  Special instructions (what to bring to appointment, etc.): Valid ID, insurance card if you have one, proof of residence (mail), proof of income (check stubs, award letter, tax return). If you are unable to attend the open enrollment date and time above please attend open enrollment any Monday, Tuesday, Thursday from 8:30am-12:30 pm.    You spoke to AU with DAVID/EVELYN. Her work number is [phone]. You spoke to us about IOP, [facility] Work has provided you with our number for the future. [phone]    Discharge Completed By: Olivia Carr, MSW   Fax to: Follow up provider name and number  [facility] Fax [phone]    The following personal items were collected during your admission, stored in locker and/or safe, and were returned to you:    Belongings  Dental Appliances: None  Vision - Corrective Lenses: Contact Lenses  Hearing Aid: None  Clothing: Pants, Shirt, Shorts, Socks, Undergarments, At bedside  Jewelry: None  Body Piercings Removed: N/A  Electronic Devices: Cell Phone,   Weapons (Notify Protective Services/Security): None  Other Valuables: Personal Toiletries, Other (Comment) (Toiletries in locker)  Home Medications: None  Valuables Given To: Other (Comment) (Safe)  Provide Name(s) of Who Valuable(s) Were Given To: N/A    By signing below, I understand and acknowledge receipt of the instructions indicated above.

## 2023-09-07 NOTE — PROGRESS NOTES
Reassessment of PRN Hydroxyzine. Patient noted to be sitting calmly in day room. Patient stated medication was effective.

## 2023-09-07 NOTE — GROUP NOTE
Group Therapy Note    Date: 9/7/2023    Group Start Time: 1100  Group End Time: 1200  Group Topic: [address], [place]        Group Therapy Note    Attendees: 8    SW used active listening to engage in conversation while checking in with the group and discussing the topic of the day. The group worked to discuss their Locus of control and how they can apply it to their communication w/others. The group also worked to use the Event plus Response equal Outcome equation to practice recognizing their responses and how they can impact an outcome but not control it. Notes:  Pt was engaged and cooperative throughout the group. The Pt was able to reflect on their locus of control and apply it to situations in their life. Status After Intervention:  Improved    Participation Level:  Active Listener and Interactive    Participation Quality: Appropriate, Attentive, and Sharing      Speech:  normal      Thought Process/Content: Logical      Affective Functioning: Congruent      Mood: Stable      Level of consciousness:  Alert      Response to Learning: Able to verbalize current knowledge/experience, Able to verbalize/acknowledge new learning, Able to retain information, and Capable of insight      Endings: None Reported    Modes of Intervention: Education, Support, and Problem-solving      Discipline Responsible: /Counselor      Signature:  BRANDON Michelle [place]

## 2023-09-07 NOTE — PROGRESS NOTES
Group Therapy Note    Date: 9/7/2023  Start Time: 1300  End Time:  1400  Number of Participants: 6    Type of Group: Music Group    Notes:  Pt present for Music Group. Pt actively participated by making song selections and singing along to music. Participation Level:  Active Listener and Interactive    Participation Quality: Appropriate and Attentive      Speech:  normal      Affective Functioning: Congruent      Endings: None Reported    Modes of Intervention: Support, Socialization, Activity, and Media      Discipline Responsible: Chaplain Malick Field       09/07/23 1428   Encounter Summary   Encounter Overview/Reason  Behavioral Health   Service Provided For: Patient   Last Encounter    (9/7 Music Group)   Complexity of Encounter Moderate   Begin Time 1300   End Time  1400   Total Time Calculated 60 min   Behavioral Health    Type  Spirituality Group

## 2023-09-07 NOTE — GROUP NOTE
Group Therapy Note    Date: 9/7/2023    Group Start Time: 1000  Group End Time: 1611  Group Topic: Psychoeducation    [facility]    BRANDON Mcdermott        Group Therapy Note  Clinician introduced the group members to the Zones of Regulation. Group members completed the group activity of filling in the zones with feeling words. They then received a handout with feelings and matching emojies and another handout with feelings wheel. Members followed instructions of circling their top 3 emojies and making the face of the one they identified with the most.   Attendees: 6       Patient's Goal:      Notes:  Patient was cooperative and fully engaged in the group discussion and activity. Patient was able to name many feelings in each zone and complete the handouts. Status After Intervention:  Improved    Participation Level:  Active Listener and Interactive    Participation Quality: Appropriate, Attentive, Sharing, and Supportive      Speech:  normal      Thought Process/Content: Logical      Affective Functioning: Congruent      Mood: euthymic      Level of consciousness:  Attentive      Response to Learning: Able to verbalize current knowledge/experience      Endings: None Reported    Modes of Intervention: Education, Support, Exploration, and Activity      Discipline Responsible: /Counselor      Signature:  BRANDON Mcdermott

## 2023-09-07 NOTE — PLAN OF CARE
Problem: Anxiety  Goal: Will report anxiety at manageable levels  Description: INTERVENTIONS:  1. Administer medication as ordered  2. Teach and rehearse alternative coping skills  3. Provide emotional support with 1:1 interaction with staff  9/7/2023 1146 by Juliette Bruno RN  Outcome: Adequate for Discharge    Problem: Coping  Goal: Pt/Family able to verbalize concerns and demonstrate effective coping strategies  Description: INTERVENTIONS:  1. Assist patient/family to identify coping skills, available support systems and cultural and spiritual values  2. Provide emotional support, including active listening and acknowledgement of concerns of patient and caregivers  3. Reduce environmental stimuli, as able  4. Instruct patient/family in relaxation techniques, as appropriate  5. Assess for spiritual pain/suffering and initiate Spiritual Care, Psychosocial Clinical Specialist consults as needed  9/7/2023 1146 by Juliette Bruno RN  Outcome: Adequate for Discharge     Problem: Behavior  Goal: Pt/Family maintain appropriate behavior and adhere to behavioral management agreement, if implemented  Description: INTERVENTIONS:  1. Assess patient/family's coping skills and  non-compliant behavior (including use of illegal substances)  2. Notify security of behavior or suspected illegal substances which indicate the need for search of the family and/or belongings  3. Encourage verbalization of thoughts and concerns in a socially appropriate manner  4. Utilize positive, consistent limit setting strategies supporting safety of patient, staff and others  5. Encourage participation in the decision making process about the behavioral management agreement  6. If a visitor's behavior poses a threat to safety call refer to organization policy.   7. Initiate consult with , Psychosocial CNS, Spiritual Care as appropriate  9/7/2023 1146 by Juliette Bruno RN  Outcome: Adequate for Discharge     Problem:

## 2023-09-07 NOTE — PLAN OF CARE
Problem: Anxiety  Goal: Will report anxiety at manageable levels  Description: INTERVENTIONS:  1. Administer medication as ordered  2. Teach and rehearse alternative coping skills  3. Provide emotional support with 1:1 interaction with staff  9/6/2023 2214 by Balwinder Negron RN  Outcome: Progressing     Problem: Coping  Goal: Pt/Family able to verbalize concerns and demonstrate effective coping strategies  Description: INTERVENTIONS:  1. Assist patient/family to identify coping skills, available support systems and cultural and spiritual values  2. Provide emotional support, including active listening and acknowledgement of concerns of patient and caregivers  3. Reduce environmental stimuli, as able  4. Instruct patient/family in relaxation techniques, as appropriate  5. Assess for spiritual pain/suffering and initiate Spiritual Care, Psychosocial Clinical Specialist consults as needed  9/6/2023 2214 by Balwinder Negron RN  Outcome: Progressing     Problem: Behavior  Goal: Pt/Family maintain appropriate behavior and adhere to behavioral management agreement, if implemented  Description: INTERVENTIONS:  1. Assess patient/family's coping skills and  non-compliant behavior (including use of illegal substances)  2. Notify security of behavior or suspected illegal substances which indicate the need for search of the family and/or belongings  3. Encourage verbalization of thoughts and concerns in a socially appropriate manner  4. Utilize positive, consistent limit setting strategies supporting safety of patient, staff and others  5. Encourage participation in the decision making process about the behavioral management agreement  6. If a visitor's behavior poses a threat to safety call refer to organization policy.   7. Initiate consult with , Psychosocial CNS, Spiritual Care as appropriate  9/6/2023 2214 by Balwinder Negron RN  Outcome: Progressing

## 2023-09-07 NOTE — PROGRESS NOTES
Patient requesting medication for anxiety d/t another peer having an outburst.  Patient states she tried other coping skills but unable to calm. Hydroxyzine given per order.   See [facility]

## 2023-09-08 ENCOUNTER — FOLLOWUP TELEPHONE ENCOUNTER (OUTPATIENT)
Dept: PSYCHIATRY | Age: 28
End: 2023-09-08

## 2023-09-11 ENCOUNTER — FOLLOWUP TELEPHONE ENCOUNTER (OUTPATIENT)
Dept: PSYCHIATRY | Age: 28
End: 2023-09-11

## 2023-10-04 PROBLEM — Z00.00 GENERAL MEDICAL EXAM: Status: RESOLVED | Noted: 2023-09-04 | Resolved: 2023-10-04

## 2024-02-04 ENCOUNTER — HOSPITAL ENCOUNTER (EMERGENCY)
Age: 29
Discharge: HOME | End: 2024-02-04
Attending: STUDENT IN AN ORGANIZED HEALTH CARE EDUCATION/TRAINING PROGRAM
Payer: COMMERCIAL

## 2024-02-04 VITALS
TEMPERATURE: 98.3 F | SYSTOLIC BLOOD PRESSURE: 148 MMHG | DIASTOLIC BLOOD PRESSURE: 100 MMHG | HEART RATE: 71 BPM | RESPIRATION RATE: 17 BRPM | OXYGEN SATURATION: 97 %

## 2024-02-04 DIAGNOSIS — R55 SYNCOPE AND COLLAPSE: Primary | ICD-10-CM

## 2024-02-04 LAB
ALBUMIN SERPL-MCNC: 4.6 G/DL (ref 3.4–5)
ALBUMIN/GLOB SERPL: 1.6 {RATIO} (ref 1.1–2.2)
ALP SERPL-CCNC: 101 U/L (ref 40–129)
ALT SERPL-CCNC: 14 U/L (ref 10–40)
AMPHETAMINES UR QL SCN>1000 NG/ML: ABNORMAL
ANION GAP SERPL CALCULATED.3IONS-SCNC: 10 MMOL/L (ref 3–16)
AST SERPL-CCNC: 9 U/L (ref 15–37)
BARBITURATES UR QL SCN>200 NG/ML: ABNORMAL
BASOPHILS # BLD: 0.1 K/UL (ref 0–0.2)
BASOPHILS NFR BLD: 1 %
BENZODIAZ UR QL SCN>200 NG/ML: ABNORMAL
BILIRUB SERPL-MCNC: 0.3 MG/DL (ref 0–1)
BUN SERPL-MCNC: 13 MG/DL (ref 7–20)
CALCIUM SERPL-MCNC: 8.7 MG/DL (ref 8.3–10.6)
CANNABINOIDS UR QL SCN>50 NG/ML: POSITIVE
CHLORIDE SERPL-SCNC: 100 MMOL/L (ref 99–110)
CO2 SERPL-SCNC: 24 MMOL/L (ref 21–32)
COCAINE UR QL SCN: ABNORMAL
CREAT SERPL-MCNC: 0.7 MG/DL (ref 0.6–1.1)
DEPRECATED RDW RBC AUTO: 14.7 % (ref 12.4–15.4)
DRUG SCREEN COMMENT UR-IMP: ABNORMAL
EOSINOPHIL # BLD: 0.1 K/UL (ref 0–0.6)
EOSINOPHIL NFR BLD: 1.6 %
FENTANYL SCREEN, URINE: ABNORMAL
GFR SERPLBLD CREATININE-BSD FMLA CKD-EPI: >60 ML/MIN/{1.73_M2}
GLUCOSE SERPL-MCNC: 93 MG/DL (ref 70–99)
HCG SERPL QL: NEGATIVE
HCT VFR BLD AUTO: 37.3 % (ref 36–48)
HGB BLD-MCNC: 12.4 G/DL (ref 12–16)
LYMPHOCYTES # BLD: 2.6 K/UL (ref 1–5.1)
LYMPHOCYTES NFR BLD: 29.1 %
MCH RBC QN AUTO: 28.1 PG (ref 26–34)
MCHC RBC AUTO-ENTMCNC: 33.3 G/DL (ref 31–36)
MCV RBC AUTO: 84.5 FL (ref 80–100)
METHADONE UR QL SCN>300 NG/ML: ABNORMAL
MONOCYTES # BLD: 0.7 K/UL (ref 0–1.3)
MONOCYTES NFR BLD: 7.6 %
NEUTROPHILS # BLD: 5.5 K/UL (ref 1.7–7.7)
NEUTROPHILS NFR BLD: 60.7 %
OPIATES UR QL SCN>300 NG/ML: ABNORMAL
OXYCODONE UR QL SCN: ABNORMAL
PCP UR QL SCN>25 NG/ML: ABNORMAL
PH UR STRIP: 6 [PH]
PLATELET # BLD AUTO: 247 K/UL (ref 135–450)
PMV BLD AUTO: 8.8 FL (ref 5–10.5)
POTASSIUM SERPL-SCNC: 3.9 MMOL/L (ref 3.5–5.1)
PROT SERPL-MCNC: 7.5 G/DL (ref 6.4–8.2)
RBC # BLD AUTO: 4.42 M/UL (ref 4–5.2)
SODIUM SERPL-SCNC: 134 MMOL/L (ref 136–145)
TROPONIN, HIGH SENSITIVITY: 6 NG/L (ref 0–14)
WBC # BLD AUTO: 9 K/UL (ref 4–11)

## 2024-02-04 PROCEDURE — 36415 COLL VENOUS BLD VENIPUNCTURE: CPT

## 2024-02-04 PROCEDURE — 85025 COMPLETE CBC W/AUTO DIFF WBC: CPT

## 2024-02-04 PROCEDURE — 84703 CHORIONIC GONADOTROPIN ASSAY: CPT

## 2024-02-04 PROCEDURE — 93005 ELECTROCARDIOGRAM TRACING: CPT | Performed by: STUDENT IN AN ORGANIZED HEALTH CARE EDUCATION/TRAINING PROGRAM

## 2024-02-04 PROCEDURE — 99284 EMERGENCY DEPT VISIT MOD MDM: CPT

## 2024-02-04 PROCEDURE — 84484 ASSAY OF TROPONIN QUANT: CPT

## 2024-02-04 PROCEDURE — 80053 COMPREHEN METABOLIC PANEL: CPT

## 2024-02-04 PROCEDURE — 80307 DRUG TEST PRSMV CHEM ANLYZR: CPT

## 2024-02-04 ASSESSMENT — PAIN - FUNCTIONAL ASSESSMENT: PAIN_FUNCTIONAL_ASSESSMENT: 0-10

## 2024-02-04 ASSESSMENT — PAIN DESCRIPTION - LOCATION: LOCATION: GENERALIZED

## 2024-02-04 ASSESSMENT — PAIN SCALES - GENERAL: PAINLEVEL_OUTOF10: 6

## 2024-02-05 LAB
EKG ATRIAL RATE: 71 BPM
EKG DIAGNOSIS: NORMAL
EKG P AXIS: 36 DEGREES
EKG P-R INTERVAL: 166 MS
EKG Q-T INTERVAL: 364 MS
EKG QRS DURATION: 90 MS
EKG QTC CALCULATION (BAZETT): 395 MS
EKG R AXIS: 30 DEGREES
EKG T AXIS: 41 DEGREES
EKG VENTRICULAR RATE: 71 BPM

## 2024-02-05 PROCEDURE — 93010 ELECTROCARDIOGRAM REPORT: CPT | Performed by: INTERNAL MEDICINE

## 2024-02-05 NOTE — DISCHARGE INSTRUCTIONS
Please return to the emergency department with any new, ongoing or worsening symptoms.  We hope you feel better soon!

## 2024-02-05 NOTE — ED PROVIDER NOTES
Cocaine Metabolite Screen, Urine Neg Negative <300 ng/mL    Opiate Scrn, Ur Neg Negative <300 ng/mL    PCP Screen, Urine Neg Negative <25 ng/mL    Methadone Screen, Urine Neg Negative <300 ng/mL    Oxycodone Urine Neg Negative <100 ng/ml    FENTANYL SCREEN, URINE Neg Negative <5 ng/mL    pH, UA 6.0     Drug Screen Comment: see below    EKG 12 Lead   Result Value Ref Range    Ventricular Rate 71 BPM    Atrial Rate 71 BPM    P-R Interval 166 ms    QRS Duration 90 ms    Q-T Interval 364 ms    QTc Calculation (Bazett) 395 ms    P Axis 36 degrees    R Axis 30 degrees    T Axis 41 degrees    Diagnosis       Normal sinus rhythmNormal ECGNo previous ECGs availableConfirmed by JACQUELINE DEGROOT MD ([id number]) on 2/5/2024 7:52:37 AM         - Patient seen and evaluated in room R3.  28 y.o. female presented for syncope.  Patient presented hypertensive but vitals otherwise normal.  On exam she is alert and oriented no focal neurologic deficits.  She has regular rate and rhythm clear breath sounds bilaterally.  Given history and exam my differential diagnosis includes but is not limited to possible seizure, orthostasis, vasovagal, anemia, dehydration, drug ingestion.  She has no tachycardia no shortness of breath and PERC negative therefore doubt underlying PE.  She has no ripping or tearing chest pain no focal neurologic deficits concerning for dissection.  I obtained labs and imaging as noted below  - Patient was placed on telemetry during her ED stay and no malignant dysrhythmia observed.  - Pertinent old records reviewed.   - Chronic medical conditions: Depressino  - Social determinants: Significant alcohol use, marijuana use, and otherwise unremarkable  - Patient did not require any medication in the ED.  Upon reassessment, patient remained stable.    - Diagnostic studies reviewed.   - Lab:   CBC with normal WBC, no evidence of anemia, normal platelets  CMP with mild hyponatremia, normal renal function, normal LFTs and

## 2024-04-25 ENCOUNTER — TELEPHONE (OUTPATIENT)
Dept: CARDIOLOGY CLINIC | Age: 29
End: 2024-04-25

## 2024-04-25 NOTE — TELEPHONE ENCOUNTER
Steven Mims MD  P [facility]  Please schedule this patient for me to see in next couple of weeks.

## 2024-05-21 ENCOUNTER — HOSPITAL ENCOUNTER (EMERGENCY)
Age: 29
Discharge: HOME | End: 2024-05-21
Payer: COMMERCIAL

## 2024-05-21 VITALS
OXYGEN SATURATION: 100 % | SYSTOLIC BLOOD PRESSURE: 158 MMHG | TEMPERATURE: 98.5 F | RESPIRATION RATE: 20 BRPM | DIASTOLIC BLOOD PRESSURE: 93 MMHG | BODY MASS INDEX: 37 KG/M2 | HEIGHT: 69 IN | WEIGHT: 249.8 LBS | HEART RATE: 69 BPM

## 2024-05-21 DIAGNOSIS — R07.89 ATYPICAL CHEST PAIN: Primary | ICD-10-CM

## 2024-05-21 DIAGNOSIS — R00.2 PALPITATIONS: ICD-10-CM

## 2024-05-21 LAB
ALBUMIN SERPL-MCNC: 3.8 G/DL (ref 3.4–5)
ALBUMIN/GLOB SERPL: 1.3 {RATIO} (ref 1.1–2.2)
ALP SERPL-CCNC: 85 U/L (ref 40–129)
ALT SERPL-CCNC: 10 U/L (ref 10–40)
ANION GAP SERPL CALCULATED.3IONS-SCNC: 14 MMOL/L (ref 3–16)
AST SERPL-CCNC: 7 U/L (ref 15–37)
BASOPHILS # BLD: 0.1 K/UL (ref 0–0.2)
BASOPHILS NFR BLD: 0.4 %
BILIRUB SERPL-MCNC: 0.4 MG/DL (ref 0–1)
BUN SERPL-MCNC: 8 MG/DL (ref 7–20)
CALCIUM SERPL-MCNC: 8.9 MG/DL (ref 8.3–10.6)
CHLORIDE SERPL-SCNC: 101 MMOL/L (ref 99–110)
CO2 SERPL-SCNC: 21 MMOL/L (ref 21–32)
CREAT SERPL-MCNC: 0.6 MG/DL (ref 0.6–1.1)
DEPRECATED RDW RBC AUTO: 13.9 % (ref 12.4–15.4)
EKG ATRIAL RATE: 82 BPM
EKG DIAGNOSIS: NORMAL
EKG P AXIS: 33 DEGREES
EKG P-R INTERVAL: 166 MS
EKG Q-T INTERVAL: 358 MS
EKG QRS DURATION: 82 MS
EKG QTC CALCULATION (BAZETT): 418 MS
EKG R AXIS: 42 DEGREES
EKG T AXIS: 36 DEGREES
EKG VENTRICULAR RATE: 82 BPM
EOSINOPHIL # BLD: 0.1 K/UL (ref 0–0.6)
EOSINOPHIL NFR BLD: 0.5 %
GFR SERPLBLD CREATININE-BSD FMLA CKD-EPI: >90 ML/MIN/{1.73_M2}
GLUCOSE SERPL-MCNC: 84 MG/DL (ref 70–99)
HCT VFR BLD AUTO: 34.9 % (ref 36–48)
HGB BLD-MCNC: 11.7 G/DL (ref 12–16)
LYMPHOCYTES # BLD: 1.4 K/UL (ref 1–5.1)
LYMPHOCYTES NFR BLD: 10 %
MCH RBC QN AUTO: 28.9 PG (ref 26–34)
MCHC RBC AUTO-ENTMCNC: 33.4 G/DL (ref 31–36)
MCV RBC AUTO: 86.4 FL (ref 80–100)
MONOCYTES # BLD: 0.5 K/UL (ref 0–1.3)
MONOCYTES NFR BLD: 3.7 %
NEUTROPHILS # BLD: 12.3 K/UL (ref 1.7–7.7)
NEUTROPHILS NFR BLD: 85.4 %
PLATELET # BLD AUTO: 243 K/UL (ref 135–450)
PMV BLD AUTO: 8.6 FL (ref 5–10.5)
POTASSIUM SERPL-SCNC: 4.1 MMOL/L (ref 3.5–5.1)
PROT SERPL-MCNC: 6.7 G/DL (ref 6.4–8.2)
RBC # BLD AUTO: 4.04 M/UL (ref 4–5.2)
SODIUM SERPL-SCNC: 136 MMOL/L (ref 136–145)
TROPONIN, HIGH SENSITIVITY: <6 NG/L (ref 0–14)
TROPONIN, HIGH SENSITIVITY: <6 NG/L (ref 0–14)
WBC # BLD AUTO: 14.4 K/UL (ref 4–11)

## 2024-05-21 PROCEDURE — 36415 COLL VENOUS BLD VENIPUNCTURE: CPT

## 2024-05-21 PROCEDURE — 85025 COMPLETE CBC W/AUTO DIFF WBC: CPT

## 2024-05-21 PROCEDURE — 84484 ASSAY OF TROPONIN QUANT: CPT

## 2024-05-21 PROCEDURE — 93005 ELECTROCARDIOGRAM TRACING: CPT | Performed by: EMERGENCY MEDICINE

## 2024-05-21 PROCEDURE — 93010 ELECTROCARDIOGRAM REPORT: CPT | Performed by: INTERNAL MEDICINE

## 2024-05-21 PROCEDURE — 99284 EMERGENCY DEPT VISIT MOD MDM: CPT

## 2024-05-21 PROCEDURE — 80053 COMPREHEN METABOLIC PANEL: CPT

## 2024-05-21 ASSESSMENT — HEART SCORE: ECG: NORMAL

## 2024-05-21 NOTE — ED PROVIDER NOTES
[facility] ED  EMERGENCY DEPARTMENT ENCOUNTER        Pt Name: Emily Stubbs  MRN: [id number]  Birthdate 1995  Date of evaluation: 5/21/2024  Provider: NAFISA Reid CNP  PCP: Taylor Young APRN - CNP  Note Started: 12:54 PM EDT 5/21/24      CRISTEL. I have evaluated this patient.        CHIEF COMPLAINT       Chief Complaint   Patient presents with    Chest Pain     Patient reports chest pain and palpations since yesterday. 15 weeks pregnant.        HISTORY OF PRESENT ILLNESS: 1 or more Elements     History From: Patient     Limitations to history : None    Social Determinants Significantly Affecting Health : None    Chief Complaint: Chest pain and palpitations     Emily Stubbs is a 28 y.o. female who presents to the emergency department today with symptoms of chest pain and palpitations.  Patient states she has had similar symptoms like this in the past and had been on propranolol.  States that she was on propranolol until she found out she was pregnant.  She states that she is 15 weeks pregnant.  Denies any abdominal pain or flank pain.  No back pain.  Denies any vaginal bleeding or discharge.  Denies any pelvic pain.  States that this is her third pregnancy.  States that she has had issues with palpitations in the past and has been following with cardiology.  States she really has not gotten any definitive answers on her palpitations and was placed on propranolol.  States that she has had this issue for several years.    Nursing Notes were all reviewed and agreed with or any disagreements were addressed in the HPI.    REVIEW OF SYSTEMS :      Review of Systems    Positives and Pertinent negatives as per HPI.     SURGICAL HISTORY     Past Surgical History:   Procedure Laterality Date    TONSILLECTOMY         CURRENTMEDICATIONS       Previous Medications    FLUOXETINE (PROZAC) 40 MG CAPSULE    Take 1 capsule by mouth daily    LORAZEPAM (ATIVAN) 0.5 MG TABLET    Take 1 tablet by  Plain radiographic images are visualized and preliminarily interpreted by the ED Provider with the below findings:          Interpretation per the Radiologist below, if available at the time of this note:    No orders to display     No results found.     No results found.    PROCEDURES   Unless otherwise noted below, none     Procedures    CRITICAL CARE TIME (.cctime)       PAST MEDICAL HISTORY      has a past medical history of Asthma, Hyperadrenalism (HCC), Mitral valve prolapse, PCOS (polycystic ovarian syndrome), and Seizure (HCC).     EMERGENCY DEPARTMENT COURSE and DIFFERENTIAL DIAGNOSIS/MDM:   Vitals:    Vitals:    05/21/24 1200 05/21/24 1411   BP: (!) 157/83 (!) 158/93   Pulse: 80 69   Resp: 20 20   Temp: 98.5 °F (36.9 °C)    TempSrc: Oral    SpO2: 98% 100%   Weight: 113.3 kg (249 lb 12.8 oz)    Height: 1.753 m (5' 9\")        Patient was given the following medications:  Medications - No data to display          Is this patient to be included in the SEP-1 Core Measure due to severe sepsis or septic shock?   No   Exclusion criteria - the patient is NOT to be included for SEP-1 Core Measure due to:  2+ SIRS criteria are not met        Chronic Conditions affecting care:    has a past medical history of Asthma, Hyperadrenalism (HCC), Mitral valve prolapse, PCOS (polycystic ovarian syndrome), and Seizure (HCC).    CONSULTS: (Who and What was discussed)  None      Records Reviewed (External and Source)     CC/HPI Summary, DDx, ED Course, and Reassessment: Patient presented to the emergency department today with symptoms of chest pain.  No fever no chills.  No cough or congestion.  No infectious type symptoms.  Reports occasional palpitations.  No fevers no pain at this time.  Patient's workup included normal troponin, CMP and CBC are grossly unremarkable.  Elevation white count at 14.4.  Platelets are 243 with a hemoglobin of 11.7.  EKG was grossly unremarkable.  Had a detailed discussion with the patient as she

## 2024-05-21 NOTE — ED PROVIDER NOTES
I have reviewed the below EKG. I was not otherwise involved in this patient's care.      EKG  The Ekg interpreted by myself  normal sinus rhythm with a rate of 82  Axis is   Normal  QTc is  normal  Intervals and Durations are unremarkable.      No specific ST-T wave changes appreciated.  No evidence of acute ischemia.   No significant change from prior EKG dated 2/4/2024        Jailene Chapman MD  05/21/24 4050

## 2024-05-22 ENCOUNTER — HOSPITAL ENCOUNTER (EMERGENCY)
Age: 29
Discharge: HOME | End: 2024-05-22
Payer: COMMERCIAL

## 2024-05-22 VITALS
WEIGHT: 249 LBS | HEIGHT: 69 IN | TEMPERATURE: 99.2 F | SYSTOLIC BLOOD PRESSURE: 138 MMHG | HEART RATE: 64 BPM | DIASTOLIC BLOOD PRESSURE: 80 MMHG | BODY MASS INDEX: 36.88 KG/M2 | RESPIRATION RATE: 16 BRPM | OXYGEN SATURATION: 100 %

## 2024-05-22 DIAGNOSIS — R11.0 NAUSEA: ICD-10-CM

## 2024-05-22 DIAGNOSIS — R07.9 CHEST PAIN, UNSPECIFIED TYPE: Primary | ICD-10-CM

## 2024-05-22 LAB
ALBUMIN SERPL-MCNC: 3.8 G/DL (ref 3.4–5)
ALBUMIN/GLOB SERPL: 1.3 {RATIO} (ref 1.1–2.2)
ALP SERPL-CCNC: 84 U/L (ref 40–129)
ALT SERPL-CCNC: 17 U/L (ref 10–40)
ANION GAP SERPL CALCULATED.3IONS-SCNC: 11 MMOL/L (ref 3–16)
AST SERPL-CCNC: 7 U/L (ref 15–37)
BASOPHILS # BLD: 0.1 K/UL (ref 0–0.2)
BASOPHILS NFR BLD: 0.4 %
BILIRUB SERPL-MCNC: 0.4 MG/DL (ref 0–1)
BUN SERPL-MCNC: 8 MG/DL (ref 7–20)
CALCIUM SERPL-MCNC: 8.9 MG/DL (ref 8.3–10.6)
CHLORIDE SERPL-SCNC: 98 MMOL/L (ref 99–110)
CO2 SERPL-SCNC: 21 MMOL/L (ref 21–32)
CREAT SERPL-MCNC: 0.6 MG/DL (ref 0.6–1.1)
D-DIMER QUANTITATIVE: 0.52 UG/ML FEU (ref 0–0.6)
DEPRECATED RDW RBC AUTO: 13.4 % (ref 12.4–15.4)
EOSINOPHIL # BLD: 0.1 K/UL (ref 0–0.6)
EOSINOPHIL NFR BLD: 0.6 %
GFR SERPLBLD CREATININE-BSD FMLA CKD-EPI: >90 ML/MIN/{1.73_M2}
GLUCOSE SERPL-MCNC: 89 MG/DL (ref 70–99)
HCT VFR BLD AUTO: 34.2 % (ref 36–48)
HGB BLD-MCNC: 11.4 G/DL (ref 12–16)
LYMPHOCYTES # BLD: 2.1 K/UL (ref 1–5.1)
LYMPHOCYTES NFR BLD: 14.3 %
MCH RBC QN AUTO: 29.2 PG (ref 26–34)
MCHC RBC AUTO-ENTMCNC: 33.3 G/DL (ref 31–36)
MCV RBC AUTO: 87.9 FL (ref 80–100)
MONOCYTES # BLD: 0.9 K/UL (ref 0–1.3)
MONOCYTES NFR BLD: 5.7 %
NEUTROPHILS # BLD: 11.9 K/UL (ref 1.7–7.7)
NEUTROPHILS NFR BLD: 79 %
PLATELET # BLD AUTO: 238 K/UL (ref 135–450)
PMV BLD AUTO: 8.4 FL (ref 5–10.5)
POTASSIUM SERPL-SCNC: 4.3 MMOL/L (ref 3.5–5.1)
PROT SERPL-MCNC: 6.8 G/DL (ref 6.4–8.2)
RBC # BLD AUTO: 3.9 M/UL (ref 4–5.2)
SODIUM SERPL-SCNC: 130 MMOL/L (ref 136–145)
TROPONIN, HIGH SENSITIVITY: <6 NG/L (ref 0–14)
WBC # BLD AUTO: 15 K/UL (ref 4–11)

## 2024-05-22 PROCEDURE — 93005 ELECTROCARDIOGRAM TRACING: CPT | Performed by: STUDENT IN AN ORGANIZED HEALTH CARE EDUCATION/TRAINING PROGRAM

## 2024-05-22 PROCEDURE — 96374 THER/PROPH/DIAG INJ IV PUSH: CPT

## 2024-05-22 PROCEDURE — 99284 EMERGENCY DEPT VISIT MOD MDM: CPT

## 2024-05-22 PROCEDURE — 6360000002 HC RX W HCPCS: Performed by: PHYSICIAN ASSISTANT

## 2024-05-22 PROCEDURE — 85379 FIBRIN DEGRADATION QUANT: CPT

## 2024-05-22 PROCEDURE — 85025 COMPLETE CBC W/AUTO DIFF WBC: CPT

## 2024-05-22 PROCEDURE — 80053 COMPREHEN METABOLIC PANEL: CPT

## 2024-05-22 PROCEDURE — 84484 ASSAY OF TROPONIN QUANT: CPT

## 2024-05-22 PROCEDURE — 2580000003 HC RX 258: Performed by: PHYSICIAN ASSISTANT

## 2024-05-22 RX ORDER — ONDANSETRON 2 MG/ML
4 INJECTION INTRAMUSCULAR; INTRAVENOUS ONCE
Status: COMPLETED | OUTPATIENT
Start: 2024-05-22 | End: 2024-05-22

## 2024-05-22 RX ORDER — HYDROGEN PEROXIDE 2.65 ML/100ML
LIQUID ORAL; TOPICAL
COMMUNITY
Start: 2024-04-27

## 2024-05-22 RX ORDER — 0.9 % SODIUM CHLORIDE 0.9 %
1000 INTRAVENOUS SOLUTION INTRAVENOUS ONCE
Status: COMPLETED | OUTPATIENT
Start: 2024-05-22 | End: 2024-05-22

## 2024-05-22 RX ORDER — ONDANSETRON 4 MG/1
4 TABLET, FILM COATED ORAL 3 TIMES DAILY PRN
Qty: 15 TABLET | Refills: 0 | Status: SHIPPED | OUTPATIENT
Start: 2024-05-22

## 2024-05-22 RX ORDER — PROMETHAZINE HYDROCHLORIDE 12.5 MG/1
TABLET ORAL
COMMUNITY
Start: 2024-03-31

## 2024-05-22 RX ORDER — PNV,CALCIUM 72/IRON/FOLIC ACID 27 MG-1 MG
1 TABLET ORAL DAILY
COMMUNITY
Start: 2024-03-16

## 2024-05-22 RX ADMIN — ONDANSETRON 4 MG: 2 INJECTION INTRAMUSCULAR; INTRAVENOUS at 19:36

## 2024-05-22 RX ADMIN — SODIUM CHLORIDE 1000 ML: 9 INJECTION, SOLUTION INTRAVENOUS at 21:00

## 2024-05-22 ASSESSMENT — PAIN - FUNCTIONAL ASSESSMENT: PAIN_FUNCTIONAL_ASSESSMENT: 0-10

## 2024-05-22 ASSESSMENT — PAIN DESCRIPTION - LOCATION: LOCATION: CHEST

## 2024-05-22 ASSESSMENT — PAIN SCALES - GENERAL: PAINLEVEL_OUTOF10: 6

## 2024-05-23 LAB
EKG ATRIAL RATE: 88 BPM
EKG DIAGNOSIS: NORMAL
EKG P AXIS: 27 DEGREES
EKG P-R INTERVAL: 162 MS
EKG Q-T INTERVAL: 346 MS
EKG QRS DURATION: 90 MS
EKG QTC CALCULATION (BAZETT): 418 MS
EKG R AXIS: 62 DEGREES
EKG T AXIS: 47 DEGREES
EKG VENTRICULAR RATE: 88 BPM

## 2024-05-23 PROCEDURE — 93010 ELECTROCARDIOGRAM REPORT: CPT | Performed by: INTERNAL MEDICINE

## 2024-05-26 NOTE — ED PROVIDER NOTES
[facility] Emergency Department    CHIEF COMPLAINT  Chest Pain (Chest pain and palpitations began Monday night. /Went to [facility] yesterday, negative cardiac workup. Supposed to follow up with cards on 6/7 but pain and palpitations getting worse. PCP recommended follow up. /Patient is 15 weeks pregnant. )      SHARED SERVICE VISIT  Evaluated by CRISTEL.  My supervising physician was available for consultation.    HISTORY OF PRESENT ILLNESS  Emily Stubbs is a 28 y.o. female who presents to the ED complaining of chest pain, heart palpitations and shortness of breath.  She is currently 15 weeks pregnant.  Symptom onset began Monday night.  Was initially evaluated at [facility] ER with a negative cardiac workup.  Case was discussed with OB/GYN and she was initially discharged home.  Patient experiencing symptoms again today with shortness of breath.  She initially consulted with her OB/GYN who instructed to come the emergency department.  OB/GYN provider did call the emergency department to discuss case with provider.  Currently recommend cardiac workup and if negative discharged home.  Does have cardiology follow-up on 6-7-2024. Denies any headache, body ache, fevers or chills.  Denies any coughing or sneezing.  Denies any sore throat or congestion.  Denies any vision changes or dizziness.  Denies any nausea, vomiting, or abdominal pain.  Denies any urinary symptoms.  Denies any diarrhea or bloody stools.  Denies any new onset back pain.  Denies any recent travel or sick contacts.    No other complaints, modifying factors or associated symptoms.     Nursing notes reviewed.   Past Medical History:   Diagnosis Date    Asthma     Hyperadrenalism (HCC)     Mitral valve prolapse     PCOS (polycystic ovarian syndrome)     Seizure (HCC)      Past Surgical History:   Procedure Laterality Date    TONSILLECTOMY       History reviewed. No pertinent family history.  Social History

## 2024-05-29 ENCOUNTER — TELEPHONE (OUTPATIENT)
Dept: CARDIOLOGY CLINIC | Age: 29
End: 2024-05-29

## 2024-05-29 NOTE — TELEPHONE ENCOUNTER
----- Message from EDA Amezquita Jr., MD sent at 5/24/2024  1:28 AM EDT -----  Please set up with cardiology.  ----- Message -----  From: Discharge Provider, Automatic  Sent: 5/22/2024   2:43 AM EDT  To: EDA Amezquita Jr., MD

## 2024-05-31 NOTE — PROGRESS NOTES
[facility] [place] Office Note  6/7/2024     Subjective:  Ms. Stubbs is 28 y.o. female who is presented multiple times to ED with chest pain. She had a loop recorder placed 8/16/22 for syncope. Personal medical history asthma, drug abuse, mitral valve prolapse, seizures, dysautonomia.     HPI: Echo 8/1/23 demonstrated EF of 56%.     She has presented to the ER multiple times with chest pain. Of note, she is now 17 weeks pregnant.     Today, she is doing well. She states she has had a very  complex history, she started having syncope episodes in childhood and those increased after having her two children. She was referred to cardiology with [facility] who then referred her to neurology. She has experienced dizziness and vision changes. She states the two specialities kept saying \"it was the other's problem. \"  She reports she used to have seizures.  She has a loop recorder and has had genetic testing done. She states she has symptoms all the time. States Dr. Carter diagnosed her with mitral valve prolapse and was started on propanolol. She was referred to [facility] who stated that this was misdiagnosed and wanted her to follow up with EP. She states that she was told it was anxiety and then it was her sugar. She has followed up with psychiatry and internal medicine and they said it was neither this. She states when she passes out her vision gets blurry, she becomes pale and calmly. It varies with her completely going unconscious and going lethargic and limp. She states going fully unconscious happens within 3-5 times a year. The extreme lethargic and limp feeling happens more frequently. She states that when she was pregnant with her two other children she was diagnosed with idiopathic hypertension.  She was started on a aspirin to prevent preeclampsia but she started having issues with bleeding and OB told her to stop this. OB has been having her monitor her blood pressures. She monitors it day and night.

## 2024-06-07 ENCOUNTER — OFFICE VISIT (OUTPATIENT)
Dept: CARDIOLOGY CLINIC | Age: 29
End: 2024-06-07
Payer: COMMERCIAL

## 2024-06-07 VITALS
OXYGEN SATURATION: 99 % | WEIGHT: 251.4 LBS | HEART RATE: 69 BPM | BODY MASS INDEX: 37.23 KG/M2 | SYSTOLIC BLOOD PRESSURE: 120 MMHG | DIASTOLIC BLOOD PRESSURE: 80 MMHG | HEIGHT: 69 IN

## 2024-06-07 DIAGNOSIS — Z79.899 MEDICATION MANAGEMENT: ICD-10-CM

## 2024-06-07 DIAGNOSIS — F48.8 PSYCHOGENIC SYNCOPE: ICD-10-CM

## 2024-06-07 DIAGNOSIS — G90.8 DYSAUTONOMIA-LIKE DISORDER: Primary | ICD-10-CM

## 2024-06-07 PROCEDURE — 99204 OFFICE O/P NEW MOD 45 MIN: CPT | Performed by: INTERNAL MEDICINE

## 2024-06-07 PROCEDURE — G8427 DOCREV CUR MEDS BY ELIG CLIN: HCPCS | Performed by: INTERNAL MEDICINE

## 2024-06-07 PROCEDURE — 1036F TOBACCO NON-USER: CPT | Performed by: INTERNAL MEDICINE

## 2024-06-07 PROCEDURE — G8419 CALC BMI OUT NRM PARAM NOF/U: HCPCS | Performed by: INTERNAL MEDICINE

## 2024-06-07 RX ORDER — DOXYLAMINE SUCCINATE 25 MG/1
TABLET ORAL
COMMUNITY
Start: 2024-03-15

## 2024-06-07 RX ORDER — HYDROXYZINE PAMOATE 25 MG/1
CAPSULE ORAL
COMMUNITY

## 2024-06-07 RX ORDER — OMEPRAZOLE 20 MG/1
20 CAPSULE, DELAYED RELEASE ORAL DAILY
COMMUNITY
Start: 2024-05-31

## 2024-06-07 NOTE — PATIENT INSTRUCTIONS
Labs reviewed in epic and discussed with patient.  Medications reviewed in office. Medications refilled as warranted  Obtain TSH, Iron studies.  Avoid caffeine, keep hydrated     Follow up after pregnancy so we can start you on medications.

## 2024-10-21 ENCOUNTER — APPOINTMENT (OUTPATIENT)
Dept: GENERAL RADIOLOGY | Age: 29
End: 2024-10-21
Payer: COMMERCIAL

## 2024-10-21 ENCOUNTER — HOSPITAL ENCOUNTER (EMERGENCY)
Age: 29
Discharge: HOME | End: 2024-10-21
Attending: STUDENT IN AN ORGANIZED HEALTH CARE EDUCATION/TRAINING PROGRAM
Payer: COMMERCIAL

## 2024-10-21 VITALS
OXYGEN SATURATION: 100 % | RESPIRATION RATE: 18 BRPM | TEMPERATURE: 98.7 F | SYSTOLIC BLOOD PRESSURE: 120 MMHG | DIASTOLIC BLOOD PRESSURE: 84 MMHG | HEART RATE: 100 BPM

## 2024-10-21 DIAGNOSIS — J06.9 ACUTE UPPER RESPIRATORY INFECTION: Primary | ICD-10-CM

## 2024-10-21 LAB
ALBUMIN SERPL-MCNC: 3.6 G/DL (ref 3.4–5)
ALBUMIN/GLOB SERPL: 1.2 {RATIO} (ref 1.1–2.2)
ALP SERPL-CCNC: 146 U/L (ref 40–129)
ALT SERPL-CCNC: 10 U/L (ref 10–40)
ANION GAP SERPL CALCULATED.3IONS-SCNC: 11 MMOL/L (ref 3–16)
AST SERPL-CCNC: 8 U/L (ref 15–37)
BASOPHILS # BLD: 0.1 K/UL (ref 0–0.2)
BASOPHILS NFR BLD: 0.4 %
BILIRUB SERPL-MCNC: 0.4 MG/DL (ref 0–1)
BUN SERPL-MCNC: 9 MG/DL (ref 7–20)
CALCIUM SERPL-MCNC: 8.3 MG/DL (ref 8.3–10.6)
CHLORIDE SERPL-SCNC: 103 MMOL/L (ref 99–110)
CO2 SERPL-SCNC: 21 MMOL/L (ref 21–32)
CREAT SERPL-MCNC: 0.7 MG/DL (ref 0.6–1.1)
DEPRECATED RDW RBC AUTO: 15.3 % (ref 12.4–15.4)
EOSINOPHIL # BLD: 0 K/UL (ref 0–0.6)
EOSINOPHIL NFR BLD: 0.2 %
GFR SERPLBLD CREATININE-BSD FMLA CKD-EPI: >90 ML/MIN/{1.73_M2}
GLUCOSE SERPL-MCNC: 96 MG/DL (ref 70–99)
HCT VFR BLD AUTO: 28.8 % (ref 36–48)
HGB BLD-MCNC: 9.2 G/DL (ref 12–16)
LYMPHOCYTES # BLD: 0.8 K/UL (ref 1–5.1)
LYMPHOCYTES NFR BLD: 4.8 %
MCH RBC QN AUTO: 23.9 PG (ref 26–34)
MCHC RBC AUTO-ENTMCNC: 31.8 G/DL (ref 31–36)
MCV RBC AUTO: 75.2 FL (ref 80–100)
MONOCYTES # BLD: 1.1 K/UL (ref 0–1.3)
MONOCYTES NFR BLD: 6.8 %
NEUTROPHILS # BLD: 13.9 K/UL (ref 1.7–7.7)
NEUTROPHILS NFR BLD: 87.8 %
PLATELET # BLD AUTO: 217 K/UL (ref 135–450)
PMV BLD AUTO: 8 FL (ref 5–10.5)
POTASSIUM SERPL-SCNC: 4.2 MMOL/L (ref 3.5–5.1)
PROT SERPL-MCNC: 6.5 G/DL (ref 6.4–8.2)
RBC # BLD AUTO: 3.83 M/UL (ref 4–5.2)
SODIUM SERPL-SCNC: 135 MMOL/L (ref 136–145)
WBC # BLD AUTO: 15.8 K/UL (ref 4–11)

## 2024-10-21 PROCEDURE — 99284 EMERGENCY DEPT VISIT MOD MDM: CPT

## 2024-10-21 PROCEDURE — 96360 HYDRATION IV INFUSION INIT: CPT

## 2024-10-21 PROCEDURE — 71045 X-RAY EXAM CHEST 1 VIEW: CPT

## 2024-10-21 PROCEDURE — 80053 COMPREHEN METABOLIC PANEL: CPT

## 2024-10-21 PROCEDURE — 85025 COMPLETE CBC W/AUTO DIFF WBC: CPT

## 2024-10-21 PROCEDURE — 6370000000 HC RX 637 (ALT 250 FOR IP)

## 2024-10-21 PROCEDURE — 2580000003 HC RX 258

## 2024-10-21 RX ORDER — 0.9 % SODIUM CHLORIDE 0.9 %
1000 INTRAVENOUS SOLUTION INTRAVENOUS ONCE
Status: COMPLETED | OUTPATIENT
Start: 2024-10-21 | End: 2024-10-21

## 2024-10-21 RX ORDER — ACETAMINOPHEN 500 MG
1000 TABLET ORAL
Status: COMPLETED | OUTPATIENT
Start: 2024-10-21 | End: 2024-10-21

## 2024-10-21 RX ADMIN — SODIUM CHLORIDE 1000 ML: 9 INJECTION, SOLUTION INTRAVENOUS at 15:16

## 2024-10-21 RX ADMIN — ACETAMINOPHEN 1000 MG: 500 TABLET ORAL at 16:24

## 2024-10-21 ASSESSMENT — PAIN - FUNCTIONAL ASSESSMENT: PAIN_FUNCTIONAL_ASSESSMENT: 0-10

## 2024-10-21 ASSESSMENT — PAIN DESCRIPTION - LOCATION: LOCATION: GENERALIZED

## 2024-10-21 ASSESSMENT — PAIN DESCRIPTION - DESCRIPTORS: DESCRIPTORS: ACHING

## 2024-10-21 ASSESSMENT — PAIN SCALES - GENERAL
PAINLEVEL_OUTOF10: 6
PAINLEVEL_OUTOF10: 6

## 2024-10-21 NOTE — DISCHARGE INSTRUCTIONS
Please continue taking the full course of the antibiotics (azithromycin and clindamycin) that you were given at the urgent care.  Please continue taking your steroid and using your inhaler as needed.  Please follow-up closely with your primary care provider and OB provider.  Take Tylenol to keep your fever down.  Return to this department if you develop any new or worsening symptoms.

## 2024-10-21 NOTE — ED TRIAGE NOTES
Pt presents to ED for SOB. States that she was diagnosed with pneumonia and prescribed prednisone, albuterol, clindamycin, and azithromycin.

## 2024-10-21 NOTE — ED NOTES
1615 Completed ambulatory O2, pt had sluggish movement but good O2 levels with peak O2 at 100% and lowest O2 at 97%

## 2024-10-22 NOTE — ED PROVIDER NOTES
I independently examined and evaluated Emily Stubbs.  I personally saw the patient and performed a substantive portion of the visit including all aspects of the medical decision making.    In brief, this 28-year-old female is presenting with cough and shortness of breath starting 3 days ago.  She is 36 weeks gestation, saw her OB/GYN today and was diagnosed with pneumonia yesterday and started on clindamycin and azithromycin.  Has been taking prednisone and albuterol with mild improvement..    Focused exam revealed   General: Alert, no acute distress, patient resting comfortably   Skin: warm, intact, no pallor noted   Head: Normocephalic, atraumatic   Eye: Normal conjunctiva   Cardiac: Normal peripheral perfusion  Respiratory: No acute distress   Musculoskeletal: No deformity, full ROM.   Neurological: alert and oriented, normal sensory and motor observed.   Psychiatric: Cooperative    ED course: Workup obtained and she does have a leukocytosis of 15.8, similar to her white count 5 months ago.  She also is mildly anemic, though denies any black or bloody stools or hematuria or vaginal bleeding.  Chest x-ray shows some department interstitial lung markings, no obvious pneumonia.  She is able to ambulate around the ED and is fatigued, but not particular short of breath and not hypoxic.  Discussed results with her and due to concerns for possible radiographically negative pneumonia I do encourage her to continue with antibiotics already prescribed and use her albuterol as needed.  Given return precautions for worsening shortness of breath, severe chest pain, fevers, other concerning symptoms.    All diagnostic, treatment, and disposition decisions were made by myself in conjunction with the advanced practice provider/resident.    I personally saw the patient and made/approved the management plan and take responsibility for the patient management.     For all further details of the patient's emergency department 
she already did OB workup including ultrasound and that patient is cleared from a OB standpoint.  States that she is sending her over here to get a chest x-ray and IV fluids.  If patient does need admission request that she be transferred over to [facility].  Informed that she has been taking Zithromax, prednisone, clindamycin for pneumonia that she was diagnosed with yesterday.  Patient started on 1 L normal saline and given p.o. Tylenol.  Chest x-ray shows interstitial lung markings consistent with reactive airway disease which could include virus etiology, not consistent with pneumonia.  CBC with leukocytosis of 15.8 and microcytic anemia consistent with previous lab work from 5 months prior.  CMP without renal insufficiency or electrolyte disturbance.  Patient was given ambulation trial, saturation at 100% on room air with the lowest being 97% during ambulation.  On reevaluation patient states that she is feeling somewhat better and feels that she is ready to go home.  Patient is okay for discharge at this time.  Patient instructed to continue taking antibiotics and medications she was given.  Patient instructed to follow-up closely with her primary care provider and OB/GYN.  Patient given strict return precautions.    Disposition Considerations (tests considered but not done, Admit vs D/C, Shared Decision Making, Pt Expectation of Test or Tx.):      The patient tolerated their visit well.  The patient and / or the family were informed of the results of any tests, a time was given to answer questions, a plan was proposed and they agreed with plan.    I am the Primary Clinician of Record.  FINAL IMPRESSION      1. Acute upper respiratory infection          DISPOSITION/PLAN     DISPOSITION Decision To Discharge 10/21/2024 04:55:05 PM  Condition at Disposition: Stable     PATIENT REFERRED TO:  Taylor Young APRN - CNP  [address]  [place] [address]  [phone]    Schedule an appointment 
No

## 2024-12-31 ENCOUNTER — HOSPITAL ENCOUNTER (EMERGENCY)
Age: 29
Discharge: HOME | End: 2024-12-31
Attending: EMERGENCY MEDICINE
Payer: COMMERCIAL

## 2024-12-31 VITALS
RESPIRATION RATE: 16 BRPM | OXYGEN SATURATION: 98 % | TEMPERATURE: 98.4 F | SYSTOLIC BLOOD PRESSURE: 136 MMHG | DIASTOLIC BLOOD PRESSURE: 76 MMHG | HEART RATE: 67 BPM | WEIGHT: 254.8 LBS | BODY MASS INDEX: 37.74 KG/M2 | HEIGHT: 69 IN

## 2024-12-31 DIAGNOSIS — G89.29 ACUTE EXACERBATION OF CHRONIC LOW BACK PAIN: Primary | ICD-10-CM

## 2024-12-31 DIAGNOSIS — M54.50 ACUTE EXACERBATION OF CHRONIC LOW BACK PAIN: Primary | ICD-10-CM

## 2024-12-31 PROCEDURE — 6370000000 HC RX 637 (ALT 250 FOR IP): Performed by: EMERGENCY MEDICINE

## 2024-12-31 PROCEDURE — 99283 EMERGENCY DEPT VISIT LOW MDM: CPT

## 2024-12-31 RX ORDER — LIDOCAINE 50 MG/G
1 PATCH TOPICAL EVERY 24 HOURS
Qty: 15 PATCH | Refills: 0 | Status: SHIPPED | OUTPATIENT
Start: 2024-12-31 | End: 2025-01-15

## 2024-12-31 RX ORDER — PREDNISONE 20 MG/1
40 TABLET ORAL ONCE
Status: COMPLETED | OUTPATIENT
Start: 2024-12-31 | End: 2024-12-31

## 2024-12-31 RX ORDER — HYDROCODONE BITARTRATE AND ACETAMINOPHEN 5; 325 MG/1; MG/1
1 TABLET ORAL EVERY 6 HOURS PRN
Qty: 10 TABLET | Refills: 0 | Status: SHIPPED | OUTPATIENT
Start: 2024-12-31 | End: 2025-01-04

## 2024-12-31 RX ORDER — METHOCARBAMOL 750 MG/1
750 TABLET, FILM COATED ORAL 3 TIMES DAILY
Qty: 30 TABLET | Refills: 0 | Status: SHIPPED | OUTPATIENT
Start: 2024-12-31 | End: 2025-01-10

## 2024-12-31 RX ORDER — LIDOCAINE 4 G/G
1 PATCH TOPICAL DAILY
Status: DISCONTINUED | OUTPATIENT
Start: 2024-12-31 | End: 2024-12-31 | Stop reason: HOSPADM

## 2024-12-31 RX ORDER — PREDNISONE 10 MG/1
TABLET ORAL
Qty: 30 TABLET | Refills: 0 | Status: SHIPPED | OUTPATIENT
Start: 2024-12-31 | End: 2025-01-12

## 2024-12-31 RX ORDER — ESCITALOPRAM OXALATE 20 MG/1
20 TABLET ORAL DAILY
COMMUNITY

## 2024-12-31 RX ORDER — HYDROCODONE BITARTRATE AND ACETAMINOPHEN 5; 325 MG/1; MG/1
1 TABLET ORAL ONCE
Status: COMPLETED | OUTPATIENT
Start: 2024-12-31 | End: 2024-12-31

## 2024-12-31 RX ORDER — METHOCARBAMOL 500 MG/1
500 TABLET, FILM COATED ORAL ONCE
Status: COMPLETED | OUTPATIENT
Start: 2024-12-31 | End: 2024-12-31

## 2024-12-31 RX ORDER — ONDANSETRON 8 MG/1
8 TABLET, ORALLY DISINTEGRATING ORAL EVERY 8 HOURS PRN
Qty: 12 TABLET | Refills: 0 | Status: SHIPPED | OUTPATIENT
Start: 2024-12-31 | End: 2025-01-05

## 2024-12-31 RX ORDER — ONDANSETRON 4 MG/1
8 TABLET, ORALLY DISINTEGRATING ORAL ONCE
Status: COMPLETED | OUTPATIENT
Start: 2024-12-31 | End: 2024-12-31

## 2024-12-31 RX ORDER — DEXTROAMPHETAMINE SACCHARATE, AMPHETAMINE ASPARTATE, DEXTROAMPHETAMINE SULFATE AND AMPHETAMINE SULFATE 5; 5; 5; 5 MG/1; MG/1; MG/1; MG/1
20 TABLET ORAL DAILY
COMMUNITY

## 2024-12-31 RX ADMIN — PREDNISONE 40 MG: 20 TABLET ORAL at 13:48

## 2024-12-31 RX ADMIN — ONDANSETRON 8 MG: 4 TABLET, ORALLY DISINTEGRATING ORAL at 13:48

## 2024-12-31 RX ADMIN — HYDROCODONE BITARTRATE AND ACETAMINOPHEN 1 TABLET: 5; 325 TABLET ORAL at 13:48

## 2024-12-31 RX ADMIN — METHOCARBAMOL 500 MG: 500 TABLET ORAL at 13:48

## 2024-12-31 ASSESSMENT — LIFESTYLE VARIABLES
HOW MANY STANDARD DRINKS CONTAINING ALCOHOL DO YOU HAVE ON A TYPICAL DAY: PATIENT DOES NOT DRINK
HOW OFTEN DO YOU HAVE A DRINK CONTAINING ALCOHOL: NEVER

## 2024-12-31 ASSESSMENT — PAIN - FUNCTIONAL ASSESSMENT
PAIN_FUNCTIONAL_ASSESSMENT: 0-10
PAIN_FUNCTIONAL_ASSESSMENT: PREVENTS OR INTERFERES WITH ALL ACTIVE AND SOME PASSIVE ACTIVITIES

## 2024-12-31 ASSESSMENT — PAIN DESCRIPTION - LOCATION: LOCATION: BACK

## 2024-12-31 ASSESSMENT — PAIN DESCRIPTION - ORIENTATION: ORIENTATION: MID;LOWER

## 2024-12-31 ASSESSMENT — PAIN DESCRIPTION - FREQUENCY: FREQUENCY: CONTINUOUS

## 2024-12-31 ASSESSMENT — PAIN DESCRIPTION - DESCRIPTORS: DESCRIPTORS: SHARP;ACHING;BURNING

## 2024-12-31 ASSESSMENT — PAIN SCALES - GENERAL: PAINLEVEL_OUTOF10: 7

## 2024-12-31 ASSESSMENT — PAIN DESCRIPTION - PAIN TYPE: TYPE: ACUTE PAIN

## 2024-12-31 ASSESSMENT — PAIN DESCRIPTION - ONSET: ONSET: ON-GOING

## 2024-12-31 NOTE — ED PROVIDER NOTES
Date    TONSILLECTOMY           CURRENT MEDICATIONS       Previous Medications    AMPHETAMINE-DEXTROAMPHETAMINE (ADDERALL) 20 MG TABLET    Take 1 tablet by mouth daily. Max Daily Amount: 20 mg    ESCITALOPRAM (LEXAPRO) 20 MG TABLET    Take 1 tablet by mouth daily    HYDROXYZINE PAMOATE (VISTARIL) 25 MG CAPSULE    TAKE 1 CAPSULE BY MOUTH ONCE DAILY AS NEEDED FOR ANXIETY    OMEPRAZOLE (PRILOSEC) 20 MG DELAYED RELEASE CAPSULE    Take 1 capsule by mouth daily       ALLERGIES     Penicillins and Sulfa antibiotics    FAMILY HISTORY     History reviewed. No pertinent family history.       SOCIAL HISTORY       Social History     Socioeconomic History    Marital status: Single     Spouse name: None    Number of children: 2    Years of education: 14    Highest education level: None   Occupational History    Occupation: Advocate     Comment: Alternative to [employer]   Tobacco Use    Smoking status: Never    Smokeless tobacco: Never   Vaping Use    Vaping status: Never Used   Substance and Sexual Activity    Alcohol use: Not Currently     Alcohol/week: 21.0 standard drinks of alcohol     Types: 7 Glasses of wine, 7 Cans of beer, 7 Shots of liquor per week     Comment: For last 2 weeks    Drug use: Yes     Types: Marijuana (Weed)     Comment: reports medical weed.     Social Determinants of Health     Food Insecurity: No Food Insecurity (11/1/2024)    Received from [facility]    Hunger Vital Sign     Worried About Running Out of Food in the Last Year: Never true     Ran Out of Food in the Last Year: Never true    [facility]    Transportation    [facility], [facility]    Interpersonal Safety    [facility], [facility]    Housing/Utilities       SCREENINGS    Vi Coma Scale  Eye Opening:

## 2024-12-31 NOTE — DISCHARGE INSTRUCTIONS
You continue to take Tylenol and ibuprofen  If these do not work then use Norco 1 every 4-6 hours  Use Lidoderm patch 12 hours on 12 hours off  Take Robaxin every 6-8 hours  Take prednisone tapering dose  Do warm showers or heating pad to your back  Follow-up if any incontinence develops of stool or urine

## 2024-12-31 NOTE — ED NOTES
AVS provided and reviewed with the patient. The patient verbalized understanding of care at home, follow up care, and emergent symptoms to return for. The patient verbalized understanding of medication side effects and restrictions. Understands to complete steroid course. No questions or concerns verbalized at this time. The patient is alert, oriented, stable, and ambulatory out of the department at the time of discharge.